# Patient Record
Sex: FEMALE | Race: WHITE | NOT HISPANIC OR LATINO
[De-identification: names, ages, dates, MRNs, and addresses within clinical notes are randomized per-mention and may not be internally consistent; named-entity substitution may affect disease eponyms.]

---

## 2021-12-13 ENCOUNTER — TRANSCRIPTION ENCOUNTER (OUTPATIENT)
Age: 36
End: 2021-12-13

## 2021-12-13 ENCOUNTER — ASOB RESULT (OUTPATIENT)
Age: 36
End: 2021-12-13

## 2021-12-13 ENCOUNTER — APPOINTMENT (OUTPATIENT)
Dept: ANTEPARTUM | Facility: CLINIC | Age: 36
End: 2021-12-13
Payer: COMMERCIAL

## 2021-12-13 PROBLEM — Z00.00 ENCOUNTER FOR PREVENTIVE HEALTH EXAMINATION: Status: ACTIVE | Noted: 2021-12-13

## 2021-12-13 PROCEDURE — 76813 OB US NUCHAL MEAS 1 GEST: CPT

## 2021-12-13 PROCEDURE — 76801 OB US < 14 WKS SINGLE FETUS: CPT

## 2022-01-05 ENCOUNTER — APPOINTMENT (OUTPATIENT)
Dept: MATERNAL FETAL MEDICINE | Facility: CLINIC | Age: 37
End: 2022-01-05
Payer: COMMERCIAL

## 2022-01-05 PROCEDURE — 99205 OFFICE O/P NEW HI 60 MIN: CPT | Mod: 95

## 2022-01-05 NOTE — DISCUSSION/SUMMARY
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 The disorder is characterized by the presence of pigmented lesions in the form of café-au-lait macules, axillary and inguinal freckling, cutaneous neurofibromas, lisch nodules of the iris, and osseus lesions. Tumors of the spinal cord or cranial nerves can be observed and have the potential for malignant transformation. Mutations in the gene for NFI, located on chromosome 17, are implicated in the majority of those affected with NF1. A large degree of variable expressivity, even within a family, can be observed in those affected.\par \par The incidence of NF1 in pregnancy is similar to that of the general population, ranging from approximately 1:3000 to 5000. A large population based [place] study noted increased rates of hypertensive disorders in pregnancy, fetal growth restriction, cerebrovascular disease,  labor and  delivery in patients with NF1. The did NOT find an increased risk of thromboembolism, acute cardiac events or maternal mortality. In terms of the effect of pregnancy on NF1, approximately 50 to 60% of patients experience growth of new or existing neurofibromas. There does appear to be a decrease in neurofibroma size in approximately 1/3 of patients postpartum, suggesting a hormonal influence on neurofibroma development. Antepartum consultation with anesthesiology is recommended due to frequent kyphoscoliosis and potential challenges for regional anesthesia.\par \par We reviewed the inheritance of NF1 and variable expressivity. Option of prenatal fetal diagnostic testing reviewed and declined. She has previously discussed this information with a genetic counselor as well.  \par \par 2. Heterozygote for factor XI deficiency:\par Personal and family bleeding history is reassuring, though Salma has not had many bleeding challenges. A positive bleeding history (which is not present) is the strongest predictor of obstetric hemorrhage. She reports seeing hematology recent and a factor XI activity of 53 percent. Based on her history and that this was noted on carrier screening would anticipate low risk of bleeding issues. However, reasonable to repeat factor activity in the 3rd trimester and continue f/u with hematology. If activity level remains > 40 percent would not anticipate issues with neuraxial anesthesia, though anesthesia consult in the 3rd trimester is recommended.\par \par 3. Maternal family h/o cardiovascular disease\par Also noted to be a carrier of familial hypercholesterolemia. This history in addition to the NF1, nulliparity and maternal age increase the risk of hypertensive d/o in the pregnancy and daily low-dose aspirin until the 34th to 36th week was recommended. Reviewed low-dose aspirin safe in setting of factor XI deficiency and can d/c earlier if any bleeding issues arise in the pregnancy. Based on this history discussed that establishing care with a cardiologist in pregnancy or after delivery is reasonable. \par \par 4. Positive carrier screening\par The positive testing in the patient and her  were reviewed. No significant reproductive risks noted. Offered follow-up with a genetic counselor.\par \par Recommendations:\par 1. Daily low-dose aspirin, 162 mg, through 34 to 36 weeks\par 2. Anesthesia consult in 3rd trimester\par 3. Will review need for MRI in pregnancy with colleagues including anesthesia\par 4. Continue follow-up with hematology. Repeat factor XI activity level in 3rd trimester.\par 5. Telehealth f/u consult in 2 weeks to discuss need for MRI\par

## 2022-01-05 NOTE — HISTORY OF PRESENT ILLNESS
[Home] : at home, [unfilled] , at the time of the visit. [Medical Office: ([facility])___] : at the medical office located in  [Verbal consent obtained from patient] : the patient, [unfilled] [FreeTextEntry1] : Maternal-Fetal Medicine consultation \par Prenatal care: 1060 obgyn\par \par Chief compliant: Neurofibromatosis in pregnancy\par \par YVON WILLOUGHBY is a 36 year  at 15w2d (SUMI 22 by ART, not IVF) that presents for a [facility] consultation. \par \par Medical history:\par 1. Neurofibromatosis type 1: Found after cafe ole spots noted at birth, has Lisch nodules, thought to be mild case. Reports a h/o scoliosis. Denies h/o brain or spinal cord involvement, but denies head or CNS imaging in the past. No other family history. Denies h/o visual disturbances or seizure.\par 2. Anorexia nervosa as a teenager, 14yo to , does not currently follow with a therapist but as in the past. "under good control" and reports being a "healthy weight" for several years now\par \par Surgical history is unremarkable, denies h/o oral surgery. [age]y/o, 5d, irregular but partially attributes anorexia. Periods continued to be irregular when anorexia well controlled except when taking OCPs. Denies h/o menorrhagia. Denies a history of anemia, but reports h/o low ferritin and B12. Gynecologic history is unremarkable. Denies bleeding issues, easy bruising or epistaxis.\par \par Current medications include prenatal vitamin, ampicillin for UTI. 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 She lives with  and dog. She reports a h/o tobacco use but quit ~7yrs ago. She denies alcohol or drug use in this pregnancy.  She has never received blood products but is willing to receive them if needed. \par \par Family history: See scanned patient provided fhx of her and her . AJ ancestry on both sides. She denies a family history of birth defects, mental retardation, developmental delay or genetic disorders. \par \par Height: 5'3" Prepregnancy weight: ~130#\par Prepregnancy BMI: 23

## 2022-01-11 ENCOUNTER — APPOINTMENT (OUTPATIENT)
Dept: ANTEPARTUM | Facility: CLINIC | Age: 37
End: 2022-01-11
Payer: COMMERCIAL

## 2022-01-11 ENCOUNTER — ASOB RESULT (OUTPATIENT)
Age: 37
End: 2022-01-11

## 2022-01-11 PROCEDURE — 76817 TRANSVAGINAL US OBSTETRIC: CPT

## 2022-01-11 PROCEDURE — 76811 OB US DETAILED SNGL FETUS: CPT

## 2022-01-18 ENCOUNTER — APPOINTMENT (OUTPATIENT)
Dept: MATERNAL FETAL MEDICINE | Facility: CLINIC | Age: 37
End: 2022-01-18
Payer: COMMERCIAL

## 2022-01-18 PROCEDURE — 99213 OFFICE O/P EST LOW 20 MIN: CPT | Mod: 95

## 2022-01-18 NOTE — DISCUSSION/SUMMARY
[FreeTextEntry1] : 1. Neurofibromatosis:\par Need for CNS imaging discussed with anesthesia, Dr. Kimball, 1/18/22. No need for imaging if no neurologic or visual symptoms. \par \par 2. Heterozygote for factor XI deficiency\par Continue follow-up with hematology. Anesthesia consult in 3rd trimester.\par \par Recommendations:\par 1. Continue daily low-dose aspirin, 162 mg, through 34 to 36 weeks\par 2. Anesthesia consult in 3rd trimester due to neurofibromatosis and factor XI deficiency\par 3. No need for CNS MRI in pregnancy if no neurologic symptoms or visual changes\par 4. Continue follow-up with hematology. Repeat factor XI activity level in 3rd trimester.\par \par \par No MFM f/u consult recommended, but patient was given my contact information and asked to reach out if questions or concerns arise. \par

## 2022-01-18 NOTE — HISTORY OF PRESENT ILLNESS
[FreeTextEntry1] : Maternal-Fetal Medicine consultation \par Prenatal care: 1060 obgyn\par \par Chief compliant: Neurofibromatosis in pregnancy\par \par YVON WILLOUGHBY is a 36 year  at 17w1d (SUMI 22 by ART, not IVF) that presents for a f/u MFM consultation. \par \par AP issues:\par 1. Neurofibromatosis type 1: Found after cafe ole spots noted at birth, has Lisch nodules, thought to be mild case. Reports a h/o scoliosis. Denies h/o brain or spinal cord involvement, but denies head or CNS imaging in the past. No other family history. Denies h/o visual disturbances or seizure.\par - Discussed case with Dr. Kimball, anesthesia, 22; No need for CNS imaging if asymptomatic, will see for consult in 3rd trimester\par \par 2. Anorexia nervosa as a teenager, 12yo to , does not currently follow with a therapist but as in the past. "under good control" and reports being a "healthy weight" for several years now\par \par 3. Factor XI heterozygote\par Bleeding history reassuring though limited. Hematology following. Anesthesia consult in 3rd trimester recommended.\par \par Height: 5'3" Prepregnancy weight: ~130#\par Prepregnancy BMI: 23 [Home] : at home, [unfilled] , at the time of the visit. [Medical Office: ([facility])___] : at the medical office located in  [Verbal consent obtained from patient] : the patient, [unfilled]

## 2022-02-15 ENCOUNTER — ASOB RESULT (OUTPATIENT)
Age: 37
End: 2022-02-15

## 2022-02-15 ENCOUNTER — APPOINTMENT (OUTPATIENT)
Dept: ANTEPARTUM | Facility: CLINIC | Age: 37
End: 2022-02-15
Payer: COMMERCIAL

## 2022-02-15 PROCEDURE — 76816 OB US FOLLOW-UP PER FETUS: CPT

## 2022-04-05 ENCOUNTER — APPOINTMENT (OUTPATIENT)
Dept: ANTEPARTUM | Facility: CLINIC | Age: 37
End: 2022-04-05
Payer: COMMERCIAL

## 2022-04-05 ENCOUNTER — ASOB RESULT (OUTPATIENT)
Age: 37
End: 2022-04-05

## 2022-04-05 PROCEDURE — 76816 OB US FOLLOW-UP PER FETUS: CPT

## 2022-04-05 PROCEDURE — 76819 FETAL BIOPHYS PROFIL W/O NST: CPT

## 2022-05-03 ENCOUNTER — ASOB RESULT (OUTPATIENT)
Age: 37
End: 2022-05-03

## 2022-05-03 ENCOUNTER — APPOINTMENT (OUTPATIENT)
Dept: ANTEPARTUM | Facility: CLINIC | Age: 37
End: 2022-05-03
Payer: COMMERCIAL

## 2022-05-03 PROCEDURE — 76818 FETAL BIOPHYS PROFILE W/NST: CPT

## 2022-05-03 PROCEDURE — 76816 OB US FOLLOW-UP PER FETUS: CPT

## 2022-05-09 ENCOUNTER — APPOINTMENT (OUTPATIENT)
Dept: PEDIATRICS | Facility: CLINIC | Age: 37
End: 2022-05-09
Payer: COMMERCIAL

## 2022-05-09 PROCEDURE — 99203 OFFICE O/P NEW LOW 30 MIN: CPT

## 2022-05-17 ENCOUNTER — ASOB RESULT (OUTPATIENT)
Age: 37
End: 2022-05-17

## 2022-05-17 ENCOUNTER — APPOINTMENT (OUTPATIENT)
Dept: ANTEPARTUM | Facility: CLINIC | Age: 37
End: 2022-05-17
Payer: COMMERCIAL

## 2022-05-17 PROCEDURE — 76816 OB US FOLLOW-UP PER FETUS: CPT

## 2022-05-17 PROCEDURE — 76819 FETAL BIOPHYS PROFIL W/O NST: CPT

## 2022-05-24 ENCOUNTER — APPOINTMENT (OUTPATIENT)
Dept: ANTEPARTUM | Facility: CLINIC | Age: 37
End: 2022-05-24
Payer: COMMERCIAL

## 2022-05-24 ENCOUNTER — ASOB RESULT (OUTPATIENT)
Age: 37
End: 2022-05-24

## 2022-05-24 PROCEDURE — 76816 OB US FOLLOW-UP PER FETUS: CPT

## 2022-05-24 PROCEDURE — 76818 FETAL BIOPHYS PROFILE W/NST: CPT

## 2022-05-31 ENCOUNTER — ASOB RESULT (OUTPATIENT)
Age: 37
End: 2022-05-31

## 2022-05-31 ENCOUNTER — APPOINTMENT (OUTPATIENT)
Dept: ANTEPARTUM | Facility: CLINIC | Age: 37
End: 2022-05-31
Payer: COMMERCIAL

## 2022-05-31 PROCEDURE — 76816 OB US FOLLOW-UP PER FETUS: CPT

## 2022-05-31 PROCEDURE — 76819 FETAL BIOPHYS PROFIL W/O NST: CPT

## 2022-06-07 ENCOUNTER — APPOINTMENT (OUTPATIENT)
Dept: ANTEPARTUM | Facility: CLINIC | Age: 37
End: 2022-06-07
Payer: COMMERCIAL

## 2022-06-07 ENCOUNTER — ASOB RESULT (OUTPATIENT)
Age: 37
End: 2022-06-07

## 2022-06-07 PROCEDURE — 76818 FETAL BIOPHYS PROFILE W/NST: CPT

## 2022-06-07 PROCEDURE — 76816 OB US FOLLOW-UP PER FETUS: CPT

## 2022-06-09 ENCOUNTER — OUTPATIENT (OUTPATIENT)
Dept: OUTPATIENT SERVICES | Facility: HOSPITAL | Age: 37
LOS: 1 days | End: 2022-06-09
Payer: COMMERCIAL

## 2022-06-09 DIAGNOSIS — O26.899 OTHER SPECIFIED PREGNANCY RELATED CONDITIONS, UNSPECIFIED TRIMESTER: ICD-10-CM

## 2022-06-09 DIAGNOSIS — Z3A.00 WEEKS OF GESTATION OF PREGNANCY NOT SPECIFIED: ICD-10-CM

## 2022-06-09 PROCEDURE — 99214 OFFICE O/P EST MOD 30 MIN: CPT

## 2022-06-14 ENCOUNTER — ASOB RESULT (OUTPATIENT)
Age: 37
End: 2022-06-14

## 2022-06-14 ENCOUNTER — APPOINTMENT (OUTPATIENT)
Dept: ANTEPARTUM | Facility: CLINIC | Age: 37
End: 2022-06-14
Payer: COMMERCIAL

## 2022-06-14 PROCEDURE — 76819 FETAL BIOPHYS PROFIL W/O NST: CPT

## 2022-06-14 PROCEDURE — 76816 OB US FOLLOW-UP PER FETUS: CPT

## 2022-06-16 ENCOUNTER — ASOB RESULT (OUTPATIENT)
Age: 37
End: 2022-06-16

## 2022-06-16 ENCOUNTER — APPOINTMENT (OUTPATIENT)
Dept: ANTEPARTUM | Facility: CLINIC | Age: 37
End: 2022-06-16
Payer: COMMERCIAL

## 2022-06-16 PROCEDURE — 76818 FETAL BIOPHYS PROFILE W/NST: CPT

## 2022-06-16 PROCEDURE — 76816 OB US FOLLOW-UP PER FETUS: CPT

## 2022-06-21 ENCOUNTER — ASOB RESULT (OUTPATIENT)
Age: 37
End: 2022-06-21

## 2022-06-21 ENCOUNTER — APPOINTMENT (OUTPATIENT)
Dept: ANTEPARTUM | Facility: CLINIC | Age: 37
End: 2022-06-21
Payer: COMMERCIAL

## 2022-06-21 ENCOUNTER — INPATIENT (INPATIENT)
Facility: HOSPITAL | Age: 37
LOS: 2 days | Discharge: HOME | End: 2022-06-24
Attending: OBSTETRICS & GYNECOLOGY | Admitting: OBSTETRICS & GYNECOLOGY
Payer: COMMERCIAL

## 2022-06-21 VITALS — HEIGHT: 63 IN | WEIGHT: 164.02 LBS

## 2022-06-21 DIAGNOSIS — O26.899 OTHER SPECIFIED PREGNANCY RELATED CONDITIONS, UNSPECIFIED TRIMESTER: ICD-10-CM

## 2022-06-21 DIAGNOSIS — Z3A.00 WEEKS OF GESTATION OF PREGNANCY NOT SPECIFIED: ICD-10-CM

## 2022-06-21 LAB
ALBUMIN SERPL ELPH-MCNC: 4.1 G/DL — SIGNIFICANT CHANGE UP (ref 3.3–5)
ALP SERPL-CCNC: 168 U/L — HIGH (ref 40–120)
ALT FLD-CCNC: 13 U/L — SIGNIFICANT CHANGE UP (ref 10–45)
ANION GAP SERPL CALC-SCNC: 13 MMOL/L — SIGNIFICANT CHANGE UP (ref 5–17)
APPEARANCE UR: CLEAR — SIGNIFICANT CHANGE UP
APTT BLD: 29.1 SEC — SIGNIFICANT CHANGE UP (ref 27.5–35.5)
AST SERPL-CCNC: 19 U/L — SIGNIFICANT CHANGE UP (ref 10–40)
BASOPHILS # BLD AUTO: 0.05 K/UL — SIGNIFICANT CHANGE UP (ref 0–0.2)
BASOPHILS NFR BLD AUTO: 0.5 % — SIGNIFICANT CHANGE UP (ref 0–2)
BILIRUB SERPL-MCNC: 0.7 MG/DL — SIGNIFICANT CHANGE UP (ref 0.2–1.2)
BILIRUB UR-MCNC: NEGATIVE — SIGNIFICANT CHANGE UP
BLD GP AB SCN SERPL QL: NEGATIVE — SIGNIFICANT CHANGE UP
BUN SERPL-MCNC: 9 MG/DL — SIGNIFICANT CHANGE UP (ref 7–23)
CALCIUM SERPL-MCNC: 9.6 MG/DL — SIGNIFICANT CHANGE UP (ref 8.4–10.5)
CHLORIDE SERPL-SCNC: 101 MMOL/L — SIGNIFICANT CHANGE UP (ref 96–108)
CO2 SERPL-SCNC: 21 MMOL/L — LOW (ref 22–31)
COLOR SPEC: YELLOW — SIGNIFICANT CHANGE UP
COVID-19 SPIKE DOMAIN AB INTERP: POSITIVE
COVID-19 SPIKE DOMAIN ANTIBODY RESULT: >250 U/ML — HIGH
CREAT ?TM UR-MCNC: 50 MG/DL — SIGNIFICANT CHANGE UP
CREAT SERPL-MCNC: 0.54 MG/DL — SIGNIFICANT CHANGE UP (ref 0.5–1.3)
DIFF PNL FLD: NEGATIVE — SIGNIFICANT CHANGE UP
EGFR: 122 ML/MIN/1.73M2 — SIGNIFICANT CHANGE UP
EOSINOPHIL # BLD AUTO: 0.05 K/UL — SIGNIFICANT CHANGE UP (ref 0–0.5)
EOSINOPHIL NFR BLD AUTO: 0.5 % — SIGNIFICANT CHANGE UP (ref 0–6)
GLUCOSE SERPL-MCNC: 79 MG/DL — SIGNIFICANT CHANGE UP (ref 70–99)
GLUCOSE UR QL: NEGATIVE — SIGNIFICANT CHANGE UP
HCT VFR BLD CALC: 40.5 % — SIGNIFICANT CHANGE UP (ref 34.5–45)
HGB BLD-MCNC: 13.4 G/DL — SIGNIFICANT CHANGE UP (ref 11.5–15.5)
IMM GRANULOCYTES NFR BLD AUTO: 0.3 % — SIGNIFICANT CHANGE UP (ref 0–1.5)
INR BLD: 0.96 — SIGNIFICANT CHANGE UP (ref 0.88–1.16)
KETONES UR-MCNC: NEGATIVE — SIGNIFICANT CHANGE UP
LDH SERPL L TO P-CCNC: 168 U/L — SIGNIFICANT CHANGE UP (ref 50–242)
LEUKOCYTE ESTERASE UR-ACNC: ABNORMAL
LYMPHOCYTES # BLD AUTO: 0.9 K/UL — LOW (ref 1–3.3)
LYMPHOCYTES # BLD AUTO: 9.7 % — LOW (ref 13–44)
MCHC RBC-ENTMCNC: 30.2 PG — SIGNIFICANT CHANGE UP (ref 27–34)
MCHC RBC-ENTMCNC: 33.1 GM/DL — SIGNIFICANT CHANGE UP (ref 32–36)
MCV RBC AUTO: 91.2 FL — SIGNIFICANT CHANGE UP (ref 80–100)
MONOCYTES # BLD AUTO: 0.83 K/UL — SIGNIFICANT CHANGE UP (ref 0–0.9)
MONOCYTES NFR BLD AUTO: 8.9 % — SIGNIFICANT CHANGE UP (ref 2–14)
NEUTROPHILS # BLD AUTO: 7.42 K/UL — HIGH (ref 1.8–7.4)
NEUTROPHILS NFR BLD AUTO: 80.1 % — HIGH (ref 43–77)
NITRITE UR-MCNC: NEGATIVE — SIGNIFICANT CHANGE UP
NRBC # BLD: 0 /100 WBCS — SIGNIFICANT CHANGE UP (ref 0–0)
PH UR: 6 — SIGNIFICANT CHANGE UP (ref 5–8)
PLATELET # BLD AUTO: 242 K/UL — SIGNIFICANT CHANGE UP (ref 150–400)
POTASSIUM SERPL-MCNC: 4.7 MMOL/L — SIGNIFICANT CHANGE UP (ref 3.5–5.3)
POTASSIUM SERPL-SCNC: 4.7 MMOL/L — SIGNIFICANT CHANGE UP (ref 3.5–5.3)
PROT ?TM UR-MCNC: 5 MG/DL — SIGNIFICANT CHANGE UP (ref 0–12)
PROT SERPL-MCNC: 6.7 G/DL — SIGNIFICANT CHANGE UP (ref 6–8.3)
PROT UR-MCNC: NEGATIVE MG/DL — SIGNIFICANT CHANGE UP
PROT/CREAT UR-RTO: 0.1 RATIO — SIGNIFICANT CHANGE UP (ref 0–0.2)
PROTHROM AB SERPL-ACNC: 11.4 SEC — SIGNIFICANT CHANGE UP (ref 10.5–13.4)
RBC # BLD: 4.44 M/UL — SIGNIFICANT CHANGE UP (ref 3.8–5.2)
RBC # FLD: 13.7 % — SIGNIFICANT CHANGE UP (ref 10.3–14.5)
RH IG SCN BLD-IMP: POSITIVE — SIGNIFICANT CHANGE UP
RH IG SCN BLD-IMP: POSITIVE — SIGNIFICANT CHANGE UP
SARS-COV-2 IGG+IGM SERPL QL IA: >250 U/ML — HIGH
SARS-COV-2 IGG+IGM SERPL QL IA: POSITIVE
SARS-COV-2 RNA SPEC QL NAA+PROBE: SIGNIFICANT CHANGE UP
SODIUM SERPL-SCNC: 135 MMOL/L — SIGNIFICANT CHANGE UP (ref 135–145)
SP GR SPEC: <=1.005 — SIGNIFICANT CHANGE UP (ref 1–1.03)
URATE SERPL-MCNC: 6.6 MG/DL — SIGNIFICANT CHANGE UP (ref 2.5–7)
UROBILINOGEN FLD QL: 0.2 E.U./DL — SIGNIFICANT CHANGE UP
WBC # BLD: 9.28 K/UL — SIGNIFICANT CHANGE UP (ref 3.8–10.5)
WBC # FLD AUTO: 9.28 K/UL — SIGNIFICANT CHANGE UP (ref 3.8–10.5)

## 2022-06-21 PROCEDURE — 76816 OB US FOLLOW-UP PER FETUS: CPT

## 2022-06-21 PROCEDURE — 76819 FETAL BIOPHYS PROFIL W/O NST: CPT

## 2022-06-21 RX ORDER — CITRIC ACID/SODIUM CITRATE 300-500 MG
15 SOLUTION, ORAL ORAL ONCE
Refills: 0 | Status: DISCONTINUED | OUTPATIENT
Start: 2022-06-21 | End: 2022-06-22

## 2022-06-21 RX ORDER — OXYTOCIN 10 UNIT/ML
1 VIAL (ML) INJECTION
Qty: 30 | Refills: 0 | Status: DISCONTINUED | OUTPATIENT
Start: 2022-06-21 | End: 2022-06-24

## 2022-06-21 RX ORDER — OXYTOCIN 10 UNIT/ML
333.33 VIAL (ML) INJECTION
Qty: 20 | Refills: 0 | Status: DISCONTINUED | OUTPATIENT
Start: 2022-06-21 | End: 2022-06-22

## 2022-06-21 RX ORDER — SODIUM CHLORIDE 9 MG/ML
1000 INJECTION, SOLUTION INTRAVENOUS
Refills: 0 | Status: DISCONTINUED | OUTPATIENT
Start: 2022-06-21 | End: 2022-06-22

## 2022-06-21 RX ADMIN — Medication 1 MILLIUNIT(S)/MIN: at 13:48

## 2022-06-21 NOTE — PATIENT PROFILE OB - FALL HARM RISK - UNIVERSAL INTERVENTIONS
Bed in lowest position, wheels locked, appropriate side rails in place/Call bell, personal items and telephone in reach/Instruct patient to call for assistance before getting out of bed or chair/Non-slip footwear when patient is out of bed/[place] to call system/Physically safe environment - no spills, clutter or unnecessary equipment/Purposeful Proactive Rounding/Room/bathroom lighting operational, light cord in reach

## 2022-06-21 NOTE — PATIENT PROFILE OB - SUPPORT PERSON PHONE
[phone]
Chest Pain    WHAT YOU NEED TO KNOW:    Chest pain can be caused by a range of conditions, from not serious to life-threatening. Chest pain can be a symptom of a digestive problem, such as acid reflux or a stomach ulcer. An anxiety attack or a strong emotion, such as anger, can also cause chest pain. Infection, inflammation, or a fracture in the bones or cartilage in your chest can cause pain or discomfort. Sometimes chest pain or pressure is caused by poor blood flow to your heart (angina). Chest pain may also be caused by life-threatening conditions such as a heart attack or blood clot in your lungs.     DISCHARGE INSTRUCTIONS:    Call 911 if:     You have any of the following signs of a heart attack:   Squeezing, pressure, or pain in your chest       and any of the following:   Discomfort or pain in your back, neck, jaw, stomach, or arm       Shortness of breath      Nausea or vomiting      Lightheadedness or a sudden cold sweat        Return to the emergency department if:     You have chest discomfort that gets worse, even with medicine.      You cough or vomit blood.       Your bowel movements are black or bloody.       You cannot stop vomiting, or it hurts to swallow.     Contact your healthcare provider if:     You have questions or concerns about your condition or care.        Medicines:     Medicines may be given to treat the cause of your chest pain. Examples include pain medicine, anxiety medicine, or medicines to increase blood flow to your heart.       Do not take certain medicines without asking your healthcare provider first. These include NSAIDs, herbal or vitamin supplements, or hormones (estrogen or progestin).       Take your medicine as directed. Contact your healthcare provider if you think your medicine is not helping or if you have side effects. Tell him or her if you are allergic to any medicine. Keep a list of the medicines, vitamins, and herbs you take. Include the amounts, and when and why you take them. Bring the list or the pill bottles to follow-up visits. Carry your medicine list with you in case of an emergency.    Follow up with your healthcare provider within 72 hours, or as directed: You may need to return for more tests to find the cause of your chest pain. You may be referred to a specialist, such as a cardiologist or gastroenterologist. Write down your questions so you remember to ask them during your visits.     Healthy living tips: The following are general healthy guidelines. If your chest pain is caused by a heart problem, your healthcare provider will give you specific guidelines to follow.    Do not smoke. Nicotine and other chemicals in cigarettes and cigars can cause lung and heart damage. Ask your healthcare provider for information if you currently smoke and need help to quit. E-cigarettes or smokeless tobacco still contain nicotine. Talk to your healthcare provider before you use these products.       Eat a variety of healthy, low-fat, low-salt foods. Healthy foods include fruits, vegetables, whole-grain breads, low-fat dairy products, beans, lean meats, and fish. Ask for more information about a heart healthy diet.      Drink plenty of water every day. Your body is made of mostly water. Water helps your body to control your temperature and blood pressure. Ask your healthcare provider how much water you should drink every day.      Ask about activity. Your healthcare provider will tell you which activities to limit or avoid. Ask when you can drive, return to work, and have sex. Ask about the best exercise plan for you.      Maintain a healthy weight. Ask your healthcare provider how much you should weigh. Ask him or her to help you create a weight loss plan if you are overweight.       Get the flu and pneumonia vaccines. All adults should get the influenza (flu) vaccine. Get it every year as soon as it becomes available. The pneumococcal vaccine is given to adults aged 65 years or older. The vaccine is given every 5 years to prevent pneumococcal disease, such as pneumonia.    If you have a stent:     Carry your stent card with you at all times.       Let all healthcare providers know that you have a stent.

## 2022-06-22 ENCOUNTER — TRANSCRIPTION ENCOUNTER (OUTPATIENT)
Age: 37
End: 2022-06-22

## 2022-06-22 ENCOUNTER — RESULT REVIEW (OUTPATIENT)
Age: 37
End: 2022-06-22

## 2022-06-22 LAB — T PALLIDUM AB TITR SER: NEGATIVE — SIGNIFICANT CHANGE UP

## 2022-06-22 PROCEDURE — 88307 TISSUE EXAM BY PATHOLOGIST: CPT | Mod: 26

## 2022-06-22 RX ORDER — IBUPROFEN 200 MG
600 TABLET ORAL EVERY 6 HOURS
Refills: 0 | Status: DISCONTINUED | OUTPATIENT
Start: 2022-06-22 | End: 2022-06-24

## 2022-06-22 RX ORDER — DIBUCAINE 1 %
1 OINTMENT (GRAM) RECTAL EVERY 6 HOURS
Refills: 0 | Status: DISCONTINUED | OUTPATIENT
Start: 2022-06-22 | End: 2022-06-24

## 2022-06-22 RX ORDER — OXYCODONE HYDROCHLORIDE 5 MG/1
5 TABLET ORAL ONCE
Refills: 0 | Status: DISCONTINUED | OUTPATIENT
Start: 2022-06-22 | End: 2022-06-24

## 2022-06-22 RX ORDER — HYDROCORTISONE 1 %
1 OINTMENT (GRAM) TOPICAL EVERY 6 HOURS
Refills: 0 | Status: DISCONTINUED | OUTPATIENT
Start: 2022-06-22 | End: 2022-06-24

## 2022-06-22 RX ORDER — AER TRAVELER 0.5 G/1
1 SOLUTION RECTAL; TOPICAL EVERY 4 HOURS
Refills: 0 | Status: DISCONTINUED | OUTPATIENT
Start: 2022-06-22 | End: 2022-06-24

## 2022-06-22 RX ORDER — KETOROLAC TROMETHAMINE 30 MG/ML
30 SYRINGE (ML) INJECTION ONCE
Refills: 0 | Status: DISCONTINUED | OUTPATIENT
Start: 2022-06-22 | End: 2022-06-22

## 2022-06-22 RX ORDER — SIMETHICONE 80 MG/1
80 TABLET, CHEWABLE ORAL EVERY 4 HOURS
Refills: 0 | Status: DISCONTINUED | OUTPATIENT
Start: 2022-06-22 | End: 2022-06-24

## 2022-06-22 RX ORDER — FENTANYL/BUPIVACAINE/NS/PF 2MCG/ML-.1
250 PLASTIC BAG, INJECTION (ML) INJECTION
Refills: 0 | Status: DISCONTINUED | OUTPATIENT
Start: 2022-06-22 | End: 2022-06-22

## 2022-06-22 RX ORDER — OXYCODONE HYDROCHLORIDE 5 MG/1
5 TABLET ORAL
Refills: 0 | Status: DISCONTINUED | OUTPATIENT
Start: 2022-06-22 | End: 2022-06-24

## 2022-06-22 RX ORDER — TETANUS TOXOID, REDUCED DIPHTHERIA TOXOID AND ACELLULAR PERTUSSIS VACCINE, ADSORBED 5; 2.5; 8; 8; 2.5 [IU]/.5ML; [IU]/.5ML; UG/.5ML; UG/.5ML; UG/.5ML
0.5 SUSPENSION INTRAMUSCULAR ONCE
Refills: 0 | Status: DISCONTINUED | OUTPATIENT
Start: 2022-06-22 | End: 2022-06-24

## 2022-06-22 RX ORDER — ACETAMINOPHEN 500 MG
3 TABLET ORAL
Qty: 0 | Refills: 0 | DISCHARGE
Start: 2022-06-22

## 2022-06-22 RX ORDER — LANOLIN
1 OINTMENT (GRAM) TOPICAL EVERY 6 HOURS
Refills: 0 | Status: DISCONTINUED | OUTPATIENT
Start: 2022-06-22 | End: 2022-06-24

## 2022-06-22 RX ORDER — OXYTOCIN 10 UNIT/ML
333.33 VIAL (ML) INJECTION
Qty: 20 | Refills: 0 | Status: DISCONTINUED | OUTPATIENT
Start: 2022-06-22 | End: 2022-06-24

## 2022-06-22 RX ORDER — DIPHENHYDRAMINE HCL 50 MG
25 CAPSULE ORAL EVERY 6 HOURS
Refills: 0 | Status: DISCONTINUED | OUTPATIENT
Start: 2022-06-22 | End: 2022-06-24

## 2022-06-22 RX ORDER — PRAMOXINE HYDROCHLORIDE 150 MG/15G
1 AEROSOL, FOAM RECTAL EVERY 4 HOURS
Refills: 0 | Status: DISCONTINUED | OUTPATIENT
Start: 2022-06-22 | End: 2022-06-24

## 2022-06-22 RX ORDER — SODIUM CHLORIDE 9 MG/ML
3 INJECTION INTRAMUSCULAR; INTRAVENOUS; SUBCUTANEOUS EVERY 8 HOURS
Refills: 0 | Status: DISCONTINUED | OUTPATIENT
Start: 2022-06-22 | End: 2022-06-24

## 2022-06-22 RX ORDER — ACETAMINOPHEN 500 MG
975 TABLET ORAL
Refills: 0 | Status: DISCONTINUED | OUTPATIENT
Start: 2022-06-22 | End: 2022-06-24

## 2022-06-22 RX ORDER — BENZOCAINE 10 %
1 GEL (GRAM) MUCOUS MEMBRANE EVERY 6 HOURS
Refills: 0 | Status: DISCONTINUED | OUTPATIENT
Start: 2022-06-22 | End: 2022-06-24

## 2022-06-22 RX ORDER — IBUPROFEN 200 MG
600 TABLET ORAL EVERY 6 HOURS
Refills: 0 | Status: COMPLETED | OUTPATIENT
Start: 2022-06-22 | End: 2023-05-21

## 2022-06-22 RX ORDER — MAGNESIUM HYDROXIDE 400 MG/1
30 TABLET, CHEWABLE ORAL
Refills: 0 | Status: DISCONTINUED | OUTPATIENT
Start: 2022-06-22 | End: 2022-06-24

## 2022-06-22 RX ORDER — IBUPROFEN 200 MG
1 TABLET ORAL
Qty: 0 | Refills: 0 | DISCHARGE
Start: 2022-06-22

## 2022-06-22 RX ADMIN — Medication 975 MILLIGRAM(S): at 13:52

## 2022-06-22 RX ADMIN — Medication 975 MILLIGRAM(S): at 14:30

## 2022-06-22 RX ADMIN — SODIUM CHLORIDE 125 MILLILITER(S): 9 INJECTION, SOLUTION INTRAVENOUS at 03:21

## 2022-06-22 RX ADMIN — Medication 1 TABLET(S): at 13:52

## 2022-06-22 RX ADMIN — Medication 975 MILLIGRAM(S): at 20:19

## 2022-06-22 RX ADMIN — SODIUM CHLORIDE 3 MILLILITER(S): 9 INJECTION INTRAMUSCULAR; INTRAVENOUS; SUBCUTANEOUS at 22:45

## 2022-06-22 RX ADMIN — Medication 975 MILLIGRAM(S): at 19:42

## 2022-06-22 RX ADMIN — SODIUM CHLORIDE 125 MILLILITER(S): 9 INJECTION, SOLUTION INTRAVENOUS at 04:24

## 2022-06-22 RX ADMIN — Medication 30 MILLIGRAM(S): at 10:34

## 2022-06-22 NOTE — LACTATION INITIAL EVALUATION - MILK SUPPLY
Pt called c/o of vomiting and cramping muscles all over her body. Pt says she was in ICU a week ago and had to be given Potassium due to low levels and that she also has a ulcer. I urged her to go to the nearest ER for evaluation. She stated that she could not go today because her  was on duty and issues with . I then again explained to her that she should be evaluated ASAP in the ER due to her recent medical HX. She then told me she would try to make it into the ER today. colostrum

## 2022-06-22 NOTE — LACTATION INITIAL EVALUATION - LACTATION INTERVENTIONS
initiate/review safe skin-to-skin/initiate/review hand expression/initiate/review pumping guidelines and safe milk handling/reverse pressure softening/initiate/review techniques for position and latch/post discharge community resources provided/review techniques to increase milk supply/review techniques to manage sore nipples/engorgement/initiate/review finger suck/initiate/review breast massage/compression/reviewed components of an effective feeding and at least 8 effective feedings per day required/reviewed importance of monitoring infant diapers, the breastfeeding log, and minimum output each day/reviewed risks of artificial nipples/reviewed benefits and recommendations for rooming in/reviewed feeding on demand/by cue at least 8 times a day/reviewed indications of inadequate milk transfer that would require supplementation

## 2022-06-22 NOTE — DISCHARGE NOTE OB - CARE PROVIDER_API CALL
Leila Lozada)  Obstetrics and Gynecology  [address] [place] [address]  Phone: [phone]  Fax: [phone]  Follow Up Time:

## 2022-06-22 NOTE — DISCHARGE NOTE OB - HOSPITAL COURSE
s/p . Patient has factor 11 deficiency, however no issues, no PPH, patient didn't receive any blood products. Uncomplicated hospital course. Patient stable at time of discharge. Patient ambulating and tolerating PO. Met all postpartum milestones.

## 2022-06-22 NOTE — DISCHARGE NOTE OB - NS MD DC FALL RISK RISK
For information on Fall & Injury Prevention, visit: [email or web address] OR  [email or web address] OR  https://www.cdc.gov/steadi/patient.html

## 2022-06-22 NOTE — DISCHARGE NOTE OB - PATIENT PORTAL LINK FT
You can access the FollowMyHealth Patient Portal offered by [facility] by registering at the following website: [email or web address]. By joining [employer]’s FollowMyHealth portal, you will also be able to view your health information using other applications (apps) compatible with our system.

## 2022-06-23 RX ADMIN — Medication 975 MILLIGRAM(S): at 09:43

## 2022-06-23 RX ADMIN — SODIUM CHLORIDE 3 MILLILITER(S): 9 INJECTION INTRAMUSCULAR; INTRAVENOUS; SUBCUTANEOUS at 22:04

## 2022-06-23 RX ADMIN — Medication 975 MILLIGRAM(S): at 22:50

## 2022-06-23 RX ADMIN — Medication 1 APPLICATION(S): at 07:19

## 2022-06-23 RX ADMIN — Medication 975 MILLIGRAM(S): at 15:30

## 2022-06-23 RX ADMIN — Medication 1 APPLICATION(S): at 07:20

## 2022-06-23 RX ADMIN — Medication 1 TABLET(S): at 12:15

## 2022-06-23 RX ADMIN — Medication 975 MILLIGRAM(S): at 20:57

## 2022-06-23 RX ADMIN — Medication 975 MILLIGRAM(S): at 15:07

## 2022-06-23 RX ADMIN — Medication 975 MILLIGRAM(S): at 01:02

## 2022-06-23 RX ADMIN — Medication 975 MILLIGRAM(S): at 01:03

## 2022-06-23 RX ADMIN — Medication 975 MILLIGRAM(S): at 09:04

## 2022-06-24 VITALS
HEART RATE: 62 BPM | SYSTOLIC BLOOD PRESSURE: 116 MMHG | OXYGEN SATURATION: 95 % | RESPIRATION RATE: 18 BRPM | DIASTOLIC BLOOD PRESSURE: 73 MMHG | TEMPERATURE: 98 F

## 2022-06-24 PROCEDURE — 85610 PROTHROMBIN TIME: CPT

## 2022-06-24 PROCEDURE — 85384 FIBRINOGEN ACTIVITY: CPT

## 2022-06-24 PROCEDURE — 86900 BLOOD TYPING SEROLOGIC ABO: CPT

## 2022-06-24 PROCEDURE — 59050 FETAL MONITOR W/REPORT: CPT

## 2022-06-24 PROCEDURE — 86850 RBC ANTIBODY SCREEN: CPT

## 2022-06-24 PROCEDURE — 36415 COLL VENOUS BLD VENIPUNCTURE: CPT

## 2022-06-24 PROCEDURE — 84550 ASSAY OF BLOOD/URIC ACID: CPT

## 2022-06-24 PROCEDURE — 88307 TISSUE EXAM BY PATHOLOGIST: CPT

## 2022-06-24 PROCEDURE — 86901 BLOOD TYPING SEROLOGIC RH(D): CPT

## 2022-06-24 PROCEDURE — 85025 COMPLETE CBC W/AUTO DIFF WBC: CPT

## 2022-06-24 PROCEDURE — 80053 COMPREHEN METABOLIC PANEL: CPT

## 2022-06-24 PROCEDURE — 81001 URINALYSIS AUTO W/SCOPE: CPT

## 2022-06-24 PROCEDURE — 82570 ASSAY OF URINE CREATININE: CPT

## 2022-06-24 PROCEDURE — 84156 ASSAY OF PROTEIN URINE: CPT

## 2022-06-24 PROCEDURE — 86780 TREPONEMA PALLIDUM: CPT

## 2022-06-24 PROCEDURE — 86769 SARS-COV-2 COVID-19 ANTIBODY: CPT

## 2022-06-24 PROCEDURE — 87635 SARS-COV-2 COVID-19 AMP PRB: CPT

## 2022-06-24 PROCEDURE — 85730 THROMBOPLASTIN TIME PARTIAL: CPT

## 2022-06-24 PROCEDURE — 99214 OFFICE O/P EST MOD 30 MIN: CPT

## 2022-06-24 PROCEDURE — 83615 LACTATE (LD) (LDH) ENZYME: CPT

## 2022-06-24 RX ADMIN — Medication 1 TABLET(S): at 13:59

## 2022-06-24 RX ADMIN — SODIUM CHLORIDE 3 MILLILITER(S): 9 INJECTION INTRAMUSCULAR; INTRAVENOUS; SUBCUTANEOUS at 05:06

## 2022-06-24 RX ADMIN — Medication 975 MILLIGRAM(S): at 16:15

## 2022-06-24 RX ADMIN — Medication 975 MILLIGRAM(S): at 03:27

## 2022-06-24 RX ADMIN — Medication 975 MILLIGRAM(S): at 11:00

## 2022-06-24 RX ADMIN — Medication 975 MILLIGRAM(S): at 10:10

## 2022-06-24 RX ADMIN — Medication 975 MILLIGRAM(S): at 15:21

## 2022-06-24 RX ADMIN — Medication 975 MILLIGRAM(S): at 04:20

## 2022-06-24 NOTE — PROGRESS NOTE ADULT - ASSESSMENT
A/P 36y PPD#0 s/p , stable  1. Pain: well controlled on OPM  2. GI: Regular diet  3. DVT prophylaxis: ambulate  4. Dispo: PPD1 or 2, unless otherwise specified
A/P 36y s/p , PPD #1  , stable, meeting postpartum milestones   - Pain: well controlled on opm  - GI: Tolerating regular diet  - : urinating without difficulty/pain  -DVT prophylaxis: ambulating frequently  -Dispo: PPD 2, unless otherwise specified  
A/P 36y s/p , PPD#2 , stable, meeting postpartum milestones   - Pain: well controlled on tylenol/motrin  - GI: Tolerating regular diet  - : urinating without difficulty/pain  - DVT prophylaxis: ambulating frequently  - Dispo: PPD 2, unless otherwise specified

## 2022-06-24 NOTE — PROGRESS NOTE ADULT - SUBJECTIVE AND OBJECTIVE BOX
Patient evaluated at bedside.   She reports pain is well controlled with po pain meds  She denies headache, dizziness, chest pain, palpitations, shortness of breathe, nausea, vomiting, heavy vaginal bleeding or perineal discomfort.    Physical Exam:  Vital Signs Last 24 Hrs  T(C): 37.3 (22 Jun 2022 11:00), Max: 37.3 (22 Jun 2022 11:00)  T(F): 99.1 (22 Jun 2022 11:00), Max: 99.1 (22 Jun 2022 11:00)  HR: 87 (22 Jun 2022 11:00) (84 - 104)  BP: 120/70 (22 Jun 2022 11:00) (120/70 - 146/83)  BP(mean): --  RR: 18 (22 Jun 2022 11:00) (18 - 18)  SpO2: 97% (22 Jun 2022 11:00) (97% - 100%)    GA: NAD, A+0 x 3  Abd: + BS, soft, nontender, nondistended, no rebound or guarding, uterus firm at midline, 1  fb below umbilicus  : lochia WNL  Extremities: no swelling or calf tenderness, reflexes +2 bilaterally                          13.4   9.28  )-----------( 242      ( 21 Jun 2022 12:27 )             40.5     06-21    135  |  101  |  9   ----------------------------<  79  4.7   |  21<L>  |  0.54    Ca    9.6      21 Jun 2022 12:27    TPro  6.7  /  Alb  4.1  /  TBili  0.7  /  DBili  x   /  AST  19  /  ALT  13  /  AlkPhos  168<H>  06-21    PT/INR - ( 21 Jun 2022 12:27 )   PT: 11.4 sec;   INR: 0.96          PTT - ( 21 Jun 2022 12:27 )  PTT:29.1 sec    
Patient seen and evaluated at bedside this morning, no acute events overnight.    She is lying comfortably in bed, reports pain is well controlled with tylenol and motrin. She has been ambulating without assistance, voiding spontaneously, and tolerating food.  Denies headache, dizziness, chest pain, palpitations, shortness of breath, nausea/vomiting, or heavy vaginal bleeding. Reports decrease in amount of vaginal bleeding and denies clots.    Physical Exam:  T(C): 36.9 (06-24-22 @ 06:16), Max: 36.9 (06-24-22 @ 06:16)  HR: 62 (06-24-22 @ 06:16) (62 - 62)  BP: 116/73 (06-24-22 @ 06:16) (116/73 - 116/73)  RR: 18 (06-24-22 @ 06:16) (18 - 18)  SpO2: 95% (06-24-22 @ 06:16) (95% - 95%)    GA: NAD, A&O x 3  CV: regular rate  Pulm: no inc WOB  Abd: soft, NT/ND, no rebound or guarding, uterus firm at midline and 2  fb below umbilicus  Perineum: normal lochia, intact, healing well  Extremities: mild pedal edema b/l, no calf tenderness            acetaminophen     Tablet .. 975 milliGRAM(s) Oral <User Schedule>  benzocaine 20%/menthol 0.5% Spray 1 Spray(s) Topical every 6 hours PRN  dibucaine 1% Ointment 1 Application(s) Topical every 6 hours PRN  diphenhydrAMINE 25 milliGRAM(s) Oral every 6 hours PRN  diphtheria/tetanus/pertussis (acellular) Vaccine (ADAcel) 0.5 milliLiter(s) IntraMuscular once  hydrocortisone 1% Cream 1 Application(s) Topical every 6 hours PRN  ibuprofen  Tablet. 600 milliGRAM(s) Oral every 6 hours  lanolin Ointment 1 Application(s) Topical every 6 hours PRN  magnesium hydroxide Suspension 30 milliLiter(s) Oral two times a day PRN  oxyCODONE    IR 5 milliGRAM(s) Oral every 3 hours PRN  oxyCODONE    IR 5 milliGRAM(s) Oral once PRN  oxytocin Infusion 333.333 milliUNIT(s)/Min IV Continuous <Continuous>  oxytocin Infusion. 1 milliUNIT(s)/Min IV Continuous <Continuous>  pramoxine 1%/zinc 5% Cream 1 Application(s) Topical every 4 hours PRN  prenatal multivitamin 1 Tablet(s) Oral daily  simethicone 80 milliGRAM(s) Chew every 4 hours PRN  sodium chloride 0.9% lock flush 3 milliLiter(s) IV Push every 8 hours  witch hazel Pads 1 Application(s) Topical every 4 hours PRN  
Patient evaluated at bedside this morning, resting comfortable in bed, no acute events overnight.  She reports pain is well controlled with tylenol and motrin  She denies headache, dizziness, chest pain, palpitations, shortness of breath, nausea, vomiting, heavy vaginal bleeding or perineal discomfort. Reports decrease in amount of vaginal bleeding and denies clots.  She has been ambulating without assistance, voiding spontaneously, and is breastfeeding.   Tolerating food well, without nausea/vomit.  Passing flatus.     Physical Exam:  T(C): 36.9 (06-23-22 @ 06:06), Max: 37.2 (06-22-22 @ 20:40)  HR: 70 (06-23-22 @ 06:06) (70 - 84)  BP: 114/72 (06-23-22 @ 06:06) (113/69 - 125/74)  RR: 18 (06-23-22 @ 06:06) (18 - 18)  SpO2: 96% (06-23-22 @ 06:06) (96% - 100%)    GA: NAD, A&O x 3  Pulm: non distressed breathing  Abd: + BS, soft, nontender, nondistended, no rebound or guarding, uterus firm at midline and 2  fb below umbilicus  Perineum: normal lochia, intact, healing well, no hematoma  Extremities: no calf tenderness  Skin: no rashes                          13.4   9.28  )-----------( 242      ( 21 Jun 2022 12:27 )             40.5     06-21    135  |  101  |  9   ----------------------------<  79  4.7   |  21<L>  |  0.54    Ca    9.6      21 Jun 2022 12:27    TPro  6.7  /  Alb  4.1  /  TBili  0.7  /  DBili  x   /  AST  19  /  ALT  13  /  AlkPhos  168<H>  06-21    acetaminophen     Tablet .. 975 milliGRAM(s) Oral <User Schedule>  benzocaine 20%/menthol 0.5% Spray 1 Spray(s) Topical every 6 hours PRN  dibucaine 1% Ointment 1 Application(s) Topical every 6 hours PRN  diphenhydrAMINE 25 milliGRAM(s) Oral every 6 hours PRN  diphtheria/tetanus/pertussis (acellular) Vaccine (ADAcel) 0.5 milliLiter(s) IntraMuscular once  fentanyl (2 MICROgram(s)/mL) + bupivacaine 0.0625%  in 0.9% Sodium Chloride PCEA 250 milliLiter(s) Epidural PCA Continuous  hydrocortisone 1% Cream 1 Application(s) Topical every 6 hours PRN  ibuprofen  Tablet. 600 milliGRAM(s) Oral every 6 hours  lanolin Ointment 1 Application(s) Topical every 6 hours PRN  magnesium hydroxide Suspension 30 milliLiter(s) Oral two times a day PRN  oxyCODONE    IR 5 milliGRAM(s) Oral every 3 hours PRN  oxyCODONE    IR 5 milliGRAM(s) Oral once PRN  oxytocin Infusion 333.333 milliUNIT(s)/Min IV Continuous <Continuous>  oxytocin Infusion. 1 milliUNIT(s)/Min IV Continuous <Continuous>  pramoxine 1%/zinc 5% Cream 1 Application(s) Topical every 4 hours PRN  prenatal multivitamin 1 Tablet(s) Oral daily  simethicone 80 milliGRAM(s) Chew every 4 hours PRN  sodium chloride 0.9% lock flush 3 milliLiter(s) IV Push every 8 hours  witch hazel Pads 1 Application(s) Topical every 4 hours PRN

## 2022-06-25 ENCOUNTER — EMERGENCY (EMERGENCY)
Facility: HOSPITAL | Age: 37
LOS: 1 days | Discharge: HOME | End: 2022-06-25
Attending: EMERGENCY MEDICINE | Admitting: EMERGENCY MEDICINE
Payer: COMMERCIAL

## 2022-06-25 VITALS
HEART RATE: 70 BPM | HEIGHT: 63 IN | SYSTOLIC BLOOD PRESSURE: 146 MMHG | RESPIRATION RATE: 18 BRPM | TEMPERATURE: 98 F | DIASTOLIC BLOOD PRESSURE: 89 MMHG | OXYGEN SATURATION: 98 %

## 2022-06-25 DIAGNOSIS — R07.89 OTHER CHEST PAIN: ICD-10-CM

## 2022-06-25 DIAGNOSIS — R22.43 LOCALIZED SWELLING, MASS AND LUMP, LOWER LIMB, BILATERAL: ICD-10-CM

## 2022-06-25 DIAGNOSIS — D68.1 HEREDITARY FACTOR XI DEFICIENCY: ICD-10-CM

## 2022-06-25 DIAGNOSIS — I49.3 VENTRICULAR PREMATURE DEPOLARIZATION: ICD-10-CM

## 2022-06-25 DIAGNOSIS — R06.02 SHORTNESS OF BREATH: ICD-10-CM

## 2022-06-25 DIAGNOSIS — Z20.822 CONTACT WITH AND (SUSPECTED) EXPOSURE TO COVID-19: ICD-10-CM

## 2022-06-25 PROCEDURE — 99291 CRITICAL CARE FIRST HOUR: CPT | Mod: 25

## 2022-06-25 PROCEDURE — 71045 X-RAY EXAM CHEST 1 VIEW: CPT | Mod: 26

## 2022-06-25 PROCEDURE — 93010 ELECTROCARDIOGRAM REPORT: CPT

## 2022-06-25 RX ORDER — LABETALOL HCL 100 MG
20 TABLET ORAL ONCE
Refills: 0 | Status: DISCONTINUED | OUTPATIENT
Start: 2022-06-25 | End: 2022-06-26

## 2022-06-25 NOTE — ED PROVIDER NOTE - PROGRESS NOTE DETAILS
Bennie: GYN consulted, requesting holding antihypertensives for now, will reassess. Klepfish: Hgb 11.1, alk phos 121, , other labs grossly wnl. CXR wnl. UA w/ moderate blood, small leuk esterase. Neg protein. BP fluctuating from 130s to low 150s. Remains well appearing. Awaiting GYN eval/dispo. Repaged. Updated pt. Klepfish: Pt feeling much better, no SOB/CP. Evaluated by GYN. Recommending PO labetalol, outpt f/u.   Discussed importance of outpt follow up and return precautions. Clinically no indication for further emergent ED workup or hospitalization at this time. Comfortable for dc, outpt f/u.

## 2022-06-25 NOTE — ED PROVIDER NOTE - WR INTERPRETATION 2
CXR negative - No pneumothorax, No opacities, No free air  (only 1 XR performed - issue w/ crossing over)

## 2022-06-25 NOTE — ED ADULT TRIAGE NOTE - ARRIVAL INFO ADDITIONAL COMMENTS
pt is 3 days s/p induced vaginal delivery who developed leg swelling and sob today.    BP at home 134/89.

## 2022-06-25 NOTE — ED PROVIDER NOTE - PATIENT PORTAL LINK FT
You can access the FollowMyHealth Patient Portal offered by [facility] by registering at the following website: [email or web address]. By joining [employer]’s FollowMyHealth portal, you will also be able to view your health information using other applications (apps) compatible with our system.

## 2022-06-25 NOTE — ED PROVIDER NOTE - CLINICAL SUMMARY MEDICAL DECISION MAKING FREE TEXT BOX
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 Since yesterday pt has mild intermittent chest pressure/SOB. Today also noticed b/l LE swelling. Tonight measured BP and was 135 which is high for her. Called her OB who referred to ED. Has unchanged mild vaginal bleeding. No other systemic symptoms.   Hypertensive, other vitals wnl. Exam as above.  ddx: Concern for post partum preeclampsia vs. less likely post partum pulm edema. Very unlikely PE.   Labs, EKG/CXR, BP control, possible Mg, GYN consult, reassess.

## 2022-06-25 NOTE — ED PROVIDER NOTE - NSFOLLOWUPINSTRUCTIONS_ED_ALL_ED_FT
Begin taking labetalol twice a day - measure your blood pressure prior to taking the medication - Do not take the medication if your blood pressure is <120/80.    Call your OB to schedule follow up appointment for this Monday or Tuesday.     Return to ER for worsening headache, worsening abdominal pain, worsening swelling, worsening vision changes, worsening chest pain, worsening lightheadedness, worsening breathing.     Follow up with cardiologist for persistent symptoms of shortness of breath/chest pain.     Follow up with cardiologist. Can call [phone] (HEART [facility]AT) to schedule appointment.    Can also call the following offices:    Dr. Keren García, Dr. Domo Zhu  [phone]  [address], [address]  [place], [place]    Dr. Eren Adrian  [phone]  [address] [facility], [address]  [place], [place]    Dr. Sinan Pickard  [phone]    [address], [address] [place], [place]  [phone]  Dr. Chin Barkley, Dr. Jocelyne Dowell, Dr. Karine Montero, Dr. Chin Torres, Dr. Williams Galan, Dr. Dustin Dixon, Dr. Milady Freeman, Dr. Eren Adrian    [address], [address]  [place], [place]  [phone]  Dr. Rico Martinez, Dr. Josephine Chester, Dr. Maria Isabel Gonzales, Dr. Sinan Pickard, Dr. Elda Pavon, Dr. Jey Zamorano, Dr. Chika Rodriguez    [address], [address] [place], [place]  [phone]  Dr. Sotero Zamorano, Dr. Luis E Ballard, Dr. Doc Fuentes, Dr. Dustin Ceja, Dr. Domo Zhu, Dr. Mey Arriaza    [address], 9 [facility]  [phone]  Dr. Warner Choi, Dr. Nikole Live, Dr. Nahid Fitzgerald, Dr. Maria Isabel Gonzales, Dr. Reuben Miller, Dr. Jey Zamorano, Dr. Ernesto Dunne, Dr. Major Shane, Dr. Gerardo Strong    [phone]  Dr. Remi Panda    [phone]  Dr. Gail Coker, Dr. Dustin Dixon    [address]  [place], [place]  [phone]  Dr. Keren García    [phone]  Dr. Óscar Stephens, Dr. Homero Lu, Dr. Za Camarillo, Dr. Santino Helton, Dr. Dana Monzon, Dr. Rosalie Enriquez, Dr. Elda Ventura, Dr. Stephane Robin, Dr. Milady Freeman    [phone]  Dr. Miguelito Núñez, Dr. Major Shane, Dr. Gerardo Strong    [address] [facility] [address], [place]  [phone]  Dr. Luis Daniel Herr, Dr. Quinn Cast, Dr. Raz Bone, Dr. Nahid Cat    [address] [place], [place]  [phone]  Dr. Luis Daniel Herr    [address] [place], [place]  [phone]  Dr. Dustin Dixon    [address]  [place], [place]  [phone]  Dr. Raz Bone    [address] [place], [place]  [phone]  Dr. Chika Rodriguez    [address] [place] [address]  [phone]  Dr. Dustin Dixon    [address] [place], [place]  [phone]  Dr. Chin Barkley    [address]  [phone]  Dr. Luis Daniel Herr, Dr. Quinn Cast, Dr. Raz Bone, Dr. Nahid Cat    [address] [facility], 2nd floor  [place], [place]  [phone]  Dr. Domo Zhu    [address] [place] [address], 3rd [place], [phone]  Dr. Nahid Fitzgerald, Dr. Maria Isabel Gonzales, Dr. Alexander Fernandez    [address] [place], [place]  [phone]  Dr. Luis Daniel Herr, Dr. Quinn Cast, Dr. Raz Bone, Dr. Nahid Cat           Preeclampsia and Eclampsia      Preeclampsia is a serious condition that may develop during pregnancy. This condition involves high blood pressure during pregnancy and causes symptoms such as headaches, vision changes, and increased swelling in the legs, hands, and face. Preeclampsia occurs after 20 weeks of pregnancy. Eclampsia is a seizure that happens from worsening preeclampsia. Diagnosing and managing preeclampsia early is important. If not treated early, it can cause serious problems for mother and baby.    There is no cure for this condition. However, during pregnancy, delivering the baby may be the best treatment for preeclampsia or eclampsia. For most women, symptoms of preeclampsia and eclampsia go away after giving birth. In rare cases, a woman may develop preeclampsia or eclampsia after giving birth. This usually occurs within 48 hours after childbirth but may occur up to 6 weeks after giving birth.      What are the causes?    The cause of this condition is not known.      What increases the risk?    The following factors make you more likely to develop preeclampsia:  •Being pregnant for the first time or being pregnant with multiples.      •Having had preeclampsia or a condition called hemolysis, elevated liver enzymes, and low platelet count (HELLP)syndrome during a past pregnancy.      •Having a family history of preeclampsia.      •Being older than age 35.      •Being obese.      •Becoming pregnant through fertility treatments.      Conditions that reduce blood flow or oxygen to your placenta and baby may also increase your risk. These include:  •High blood pressure before, during, or immediately following pregnancy.      •Kidney disease.      •Diabetes.      •Blood clotting disorders.      •Autoimmune diseases, such as lupus.      •Sleep apnea.        What are the signs or symptoms?    Common symptoms of this condition include:  •A severe, throbbing headache that does not go away.      •Vision problems, such as blurred or double vision and light sensitivity.      •Pain in the stomach, especially the right upper region.      •Pain in the shoulder.      Other symptoms that may develop as the condition gets worse include:  •Sudden weight gain because of fluid buildup in the body. This causes swelling of the face, hands, legs, and feet.      •Severe nausea and vomiting.      •Urinating less than usual.      •Shortness of breath.      •Seizures.        How is this diagnosed?     Your health care provider will ask you about symptoms and check for signs of preeclampsia during your prenatal visits. You will also have routine tests, including:  •Checking your blood pressure.      •Urine tests to check for protein.      •Blood tests to assess your organ function.      •Monitoring your baby's heart rate.      •Ultrasounds to check fetal growth.        How is this treated?    You and your health care provider will determine the treatment that is best for you. Treatment may include:  •Frequent prenatal visits to check for preeclampsia.      •Medicine to lower your blood pressure.      •Medicine to prevent seizures.      •Low-dose aspirin during your pregnancy.      •Staying in the hospital, in severe cases. You will be given medicines to control your blood pressure and the amount of fluids in your body.      •Delivering your baby.      Work with your health care provider to manage any chronic health conditions, such as diabetes or kidney problems. Also, work with your health care provider to manage weight gain during pregnancy.      Follow these instructions at home:    Eating and drinking     •Drink enough fluid to keep your urine pale yellow.      •Avoid caffeine. Caffeine may increase blood pressure and heart rate and lead to dehydration.      •Reduce the amount of salt that you eat.      Lifestyle     • Do not use any products that contain nicotine or tobacco. These products include cigarettes, chewing tobacco, and vaping devices, such as e-cigarettes. If you need help quitting, ask your health care provider.      • Do not use alcohol or drugs.      •Avoid stress as much as possible.      •Rest and get plenty of sleep.        General instructions      •Take over-the-counter and prescription medicines only as told by your health care provider.      •When lying down, lie on your left side. This keeps pressure off your major blood vessels.      •When sitting or lying down, raise (elevate) your feet. Try putting pillows underneath your lower legs.      •Exercise regularly. Ask your health care provider what kinds of exercise are best for you.      •Check your blood pressure as often as recommended by your health care provider.      •Keep all prenatal and follow-up visits. This is important.        Contact a health care provider if:  •You have symptoms that may need treatment or closer monitoring. These include:  •Headaches.      •Stomach pain or nausea and vomiting.      •Shoulder pain.      •Vision problems, such as spots in front of your eyes or blurry vision.      •Sudden weight gain or increased swelling in your face, hands, legs, and feet.      •Increased anxiety or feeling of impending doom.      •Signs or symptoms of labor.          Get help right away if:  •You have any of the following symptoms:  •A seizure.      •Shortness of breath or trouble breathing.      •Trouble speaking or slurred speech.      •Fainting.      •Chest pain.        These symptoms may represent a serious problem that is an emergency. Do not wait to see if the symptoms will go away. Get medical help right away. Call your local emergency services (911 in the [place] Do not drive yourself to the hospital.       Summary    •Preeclampsia is a serious condition that may develop during pregnancy.      •Diagnosing and treating preeclampsia early is very important.      •Keep all prenatal and follow-up visits. This is important.      •Get help right away if you have a seizure, shortness of breath or trouble breathing, trouble speaking or slurred speech, chest pain, or fainting.      This information is not intended to replace advice given to you by your health care provider. Make sure you discuss any questions you have with your health care provider.

## 2022-06-26 VITALS
HEART RATE: 68 BPM | RESPIRATION RATE: 18 BRPM | TEMPERATURE: 98 F | SYSTOLIC BLOOD PRESSURE: 136 MMHG | DIASTOLIC BLOOD PRESSURE: 70 MMHG | OXYGEN SATURATION: 98 %

## 2022-06-26 LAB
ALBUMIN SERPL ELPH-MCNC: 3.8 G/DL — SIGNIFICANT CHANGE UP (ref 3.3–5)
ALP SERPL-CCNC: 121 U/L — HIGH (ref 40–120)
ALT FLD-CCNC: 16 U/L — SIGNIFICANT CHANGE UP (ref 10–45)
ANION GAP SERPL CALC-SCNC: 9 MMOL/L — SIGNIFICANT CHANGE UP (ref 5–17)
APPEARANCE UR: CLEAR — SIGNIFICANT CHANGE UP
APTT BLD: 30 SEC — SIGNIFICANT CHANGE UP (ref 27.5–35.5)
AST SERPL-CCNC: 21 U/L — SIGNIFICANT CHANGE UP (ref 10–40)
BACTERIA # UR AUTO: PRESENT /HPF
BASOPHILS # BLD AUTO: 0.06 K/UL — SIGNIFICANT CHANGE UP (ref 0–0.2)
BASOPHILS NFR BLD AUTO: 0.8 % — SIGNIFICANT CHANGE UP (ref 0–2)
BILIRUB SERPL-MCNC: 0.3 MG/DL — SIGNIFICANT CHANGE UP (ref 0.2–1.2)
BILIRUB UR-MCNC: NEGATIVE — SIGNIFICANT CHANGE UP
BUN SERPL-MCNC: 14 MG/DL — SIGNIFICANT CHANGE UP (ref 7–23)
CALCIUM SERPL-MCNC: 9.2 MG/DL — SIGNIFICANT CHANGE UP (ref 8.4–10.5)
CHLORIDE SERPL-SCNC: 105 MMOL/L — SIGNIFICANT CHANGE UP (ref 96–108)
CO2 SERPL-SCNC: 24 MMOL/L — SIGNIFICANT CHANGE UP (ref 22–31)
COLOR SPEC: YELLOW — SIGNIFICANT CHANGE UP
CREAT ?TM UR-MCNC: 8 MG/DL — SIGNIFICANT CHANGE UP
CREAT SERPL-MCNC: 0.58 MG/DL — SIGNIFICANT CHANGE UP (ref 0.5–1.3)
DIFF PNL FLD: ABNORMAL
EGFR: 120 ML/MIN/1.73M2 — SIGNIFICANT CHANGE UP
EOSINOPHIL # BLD AUTO: 0.37 K/UL — SIGNIFICANT CHANGE UP (ref 0–0.5)
EOSINOPHIL NFR BLD AUTO: 4.7 % — SIGNIFICANT CHANGE UP (ref 0–6)
EPI CELLS # UR: SIGNIFICANT CHANGE UP /HPF (ref 0–5)
GLUCOSE SERPL-MCNC: 85 MG/DL — SIGNIFICANT CHANGE UP (ref 70–99)
GLUCOSE UR QL: NEGATIVE — SIGNIFICANT CHANGE UP
HCT VFR BLD CALC: 36.9 % — SIGNIFICANT CHANGE UP (ref 34.5–45)
HGB BLD-MCNC: 11.1 G/DL — LOW (ref 11.5–15.5)
IMM GRANULOCYTES NFR BLD AUTO: 0.5 % — SIGNIFICANT CHANGE UP (ref 0–1.5)
INR BLD: 0.97 — SIGNIFICANT CHANGE UP (ref 0.88–1.16)
KETONES UR-MCNC: NEGATIVE — SIGNIFICANT CHANGE UP
LEUKOCYTE ESTERASE UR-ACNC: ABNORMAL
LYMPHOCYTES # BLD AUTO: 1.37 K/UL — SIGNIFICANT CHANGE UP (ref 1–3.3)
LYMPHOCYTES # BLD AUTO: 17.3 % — SIGNIFICANT CHANGE UP (ref 13–44)
MAGNESIUM SERPL-MCNC: 2 MG/DL — SIGNIFICANT CHANGE UP (ref 1.6–2.6)
MCHC RBC-ENTMCNC: 30.1 GM/DL — LOW (ref 32–36)
MCHC RBC-ENTMCNC: 30.7 PG — SIGNIFICANT CHANGE UP (ref 27–34)
MCV RBC AUTO: 102.2 FL — HIGH (ref 80–100)
MONOCYTES # BLD AUTO: 0.95 K/UL — HIGH (ref 0–0.9)
MONOCYTES NFR BLD AUTO: 12 % — SIGNIFICANT CHANGE UP (ref 2–14)
NEUTROPHILS # BLD AUTO: 5.14 K/UL — SIGNIFICANT CHANGE UP (ref 1.8–7.4)
NEUTROPHILS NFR BLD AUTO: 64.7 % — SIGNIFICANT CHANGE UP (ref 43–77)
NITRITE UR-MCNC: NEGATIVE — SIGNIFICANT CHANGE UP
NRBC # BLD: 0 /100 WBCS — SIGNIFICANT CHANGE UP (ref 0–0)
NT-PROBNP SERPL-SCNC: 506 PG/ML — HIGH (ref 0–300)
PH UR: 7 — SIGNIFICANT CHANGE UP (ref 5–8)
PLATELET # BLD AUTO: 273 K/UL — SIGNIFICANT CHANGE UP (ref 150–400)
POTASSIUM SERPL-MCNC: 4.6 MMOL/L — SIGNIFICANT CHANGE UP (ref 3.5–5.3)
POTASSIUM SERPL-SCNC: 4.6 MMOL/L — SIGNIFICANT CHANGE UP (ref 3.5–5.3)
PROT ?TM UR-MCNC: <4 MG/DL — SIGNIFICANT CHANGE UP (ref 0–12)
PROT SERPL-MCNC: 6.2 G/DL — SIGNIFICANT CHANGE UP (ref 6–8.3)
PROT UR-MCNC: NEGATIVE MG/DL — SIGNIFICANT CHANGE UP
PROT/CREAT UR-RTO: SIGNIFICANT CHANGE UP (ref 0–0.2)
PROTHROM AB SERPL-ACNC: 11.5 SEC — SIGNIFICANT CHANGE UP (ref 10.5–13.4)
RBC # BLD: 3.61 M/UL — LOW (ref 3.8–5.2)
RBC # FLD: 13.2 % — SIGNIFICANT CHANGE UP (ref 10.3–14.5)
RBC CASTS # UR COMP ASSIST: < 5 /HPF — SIGNIFICANT CHANGE UP
SARS-COV-2 RNA SPEC QL NAA+PROBE: NEGATIVE — SIGNIFICANT CHANGE UP
SODIUM SERPL-SCNC: 138 MMOL/L — SIGNIFICANT CHANGE UP (ref 135–145)
SP GR SPEC: 1.01 — SIGNIFICANT CHANGE UP (ref 1–1.03)
TROPONIN T SERPL-MCNC: <0.01 NG/ML — SIGNIFICANT CHANGE UP (ref 0–0.01)
URATE SERPL-MCNC: 6.5 MG/DL — SIGNIFICANT CHANGE UP (ref 2.5–7)
UROBILINOGEN FLD QL: 0.2 E.U./DL — SIGNIFICANT CHANGE UP
WBC # BLD: 7.93 K/UL — SIGNIFICANT CHANGE UP (ref 3.8–10.5)
WBC # FLD AUTO: 7.93 K/UL — SIGNIFICANT CHANGE UP (ref 3.8–10.5)
WBC UR QL: < 5 /HPF — SIGNIFICANT CHANGE UP

## 2022-06-26 PROCEDURE — 71045 X-RAY EXAM CHEST 1 VIEW: CPT | Mod: 26,76

## 2022-06-26 PROCEDURE — 71045 X-RAY EXAM CHEST 1 VIEW: CPT

## 2022-06-26 PROCEDURE — 93005 ELECTROCARDIOGRAM TRACING: CPT

## 2022-06-26 PROCEDURE — 36415 COLL VENOUS BLD VENIPUNCTURE: CPT

## 2022-06-26 PROCEDURE — 82570 ASSAY OF URINE CREATININE: CPT

## 2022-06-26 PROCEDURE — 80053 COMPREHEN METABOLIC PANEL: CPT

## 2022-06-26 PROCEDURE — 85025 COMPLETE CBC W/AUTO DIFF WBC: CPT

## 2022-06-26 PROCEDURE — 87635 SARS-COV-2 COVID-19 AMP PRB: CPT

## 2022-06-26 PROCEDURE — 84484 ASSAY OF TROPONIN QUANT: CPT

## 2022-06-26 PROCEDURE — 85730 THROMBOPLASTIN TIME PARTIAL: CPT

## 2022-06-26 PROCEDURE — 83735 ASSAY OF MAGNESIUM: CPT

## 2022-06-26 PROCEDURE — 84550 ASSAY OF BLOOD/URIC ACID: CPT

## 2022-06-26 PROCEDURE — 84156 ASSAY OF PROTEIN URINE: CPT

## 2022-06-26 PROCEDURE — 83880 ASSAY OF NATRIURETIC PEPTIDE: CPT

## 2022-06-26 PROCEDURE — 99291 CRITICAL CARE FIRST HOUR: CPT | Mod: 25

## 2022-06-26 PROCEDURE — 81001 URINALYSIS AUTO W/SCOPE: CPT

## 2022-06-26 PROCEDURE — 85610 PROTHROMBIN TIME: CPT

## 2022-06-26 PROCEDURE — 87086 URINE CULTURE/COLONY COUNT: CPT

## 2022-06-26 RX ORDER — LABETALOL HCL 100 MG
1 TABLET ORAL
Qty: 14 | Refills: 0
Start: 2022-06-26 | End: 2022-07-02

## 2022-06-26 RX ORDER — LABETALOL HCL 100 MG
200 TABLET ORAL ONCE
Refills: 0 | Status: COMPLETED | OUTPATIENT
Start: 2022-06-26 | End: 2022-06-26

## 2022-06-26 RX ADMIN — Medication 200 MILLIGRAM(S): at 02:31

## 2022-06-26 NOTE — ED ADULT NURSE NOTE - OBJECTIVE STATEMENT
36 y F, arrived to ED after pt gave birth 3 days ago at [facility], pt states her bp has been elevated at home to 160s, pt brought to ED. pt denies dizziness, headache. denies chest pain, sob, nausea, vomiting, fever

## 2022-06-26 NOTE — CONSULT NOTE ADULT - ASSESSMENT
35yo  PP4 s/p  after IOL for oligohydramnios (JARRETT 4.57).  Comes in bc she took her BP at home and noted 139/80, her ankles were swollen, and she had some chest tightness.   - High mild range BPs, no need to push IV medication, and no symptoms currently except for LE edema which is bilateral and within expected limits in postpartum period  - CXR clear per ED, with no SOB currently and clear lungs on physical exam, satting well ORA  - Negative trops, non-tachycardic, elevated trops but per ED attending = nonspecific finding which is not concerning given lack of other findings.  - Would recommend close outpatient monitoring with f/u in 1 week and if continued chest symptoms, would recommend outpatient f/u with Cardiology.  - Lab work within normal limits.    - Recommend Labetalol 200 BID with 2x/day BP checks. If lower than 120/80, skip medication.  - High risk for PEC given hx of gHTN, however pt is complaint and responsible and there is low concern for being lost to follow-up per attending. OK for discharge home with below strict precautions and for follow-up early next week with her outpatient OBGYN.  - If SBPs >160 or DBP >110, please call your OBGYN right away or come to the ED. Look out for changes in vision, headaches that don't go away, new shortness of breath on exertion, or upper abdominal pain. If you notice anything like this, all your doctor right away.    -   Dr. Richard PGY2 bedside  Discussed w/ Dr. Almeida (PGY3) and Dr. Osman (Attending)

## 2022-06-26 NOTE — ED ADULT NURSE REASSESSMENT NOTE - NS ED NURSE REASSESS COMMENT FT1
GYN at bedside, awaiting on recs. pt blood pressure continue to be in the high 130 to 150 range systolic.

## 2022-06-26 NOTE — CONSULT NOTE ADULT - SUBJECTIVE AND OBJECTIVE BOX
35yo  PP4 s/p  after IOL for oligohydramnios (JARRETT 4.57) c/b gHTN.  Comes in bc she took her BP at home and noted 139/80, her ankles were swollen, and she had some chest tightness. Her chest tightness as resolved and she reports she has been quite stressed out and sleep deprived since the birth of her daughter. She denies visual symptoms, RUQ pain, or headache. Meeting all postpartum milestones: eating/drinking, ambulating, voiding, +flatus, +bm. Pain has resolved. No vaginal discharge/odor. Mild amount of lochia. Pt is breastfeeding with no difficulty.     Pt denies fever, chills, SOB, nausea, vomiting    denies PMHx except for factor XI carrier  denies PSHx  PObHx:   PGynHx: denies F/OC/STI/abnormal pap  SH: no toxic habits  Meds: PNV  NKDA    PHYSICAL EXAM:   Vital Signs Last 24 Hrs  T(C): 36.7 (2022 23:32), Max: 36.7 (2022 23:32)  T(F): 98 (2022 23:32), Max: 98 (2022 23:32)  HR: 64 (2022 01:01) (60 - 70)  BP: 148/77 (2022 01:01) (139/75 - 155/82)  RR: 18 (2022 00:03) (18 - 18)  SpO2: 98% (2022 00:03) (98% - 98%)      Constitutional: NAD  Respiratory: CTAB; no wheezing, rhonchi, or crackles  Cardiovascular: RRR, no murmurs, or gallops  Gastrointestinal: soft, non-tender, non-distended, +bowel sounds, no rebound or guarding, no fundal tenderness, fundus 3fb below umbilicus  GYN: small dark brown lochia on pad  Extremities: no calf tenderness or swelling, 2+ reflexes at biceps      LABS:                        11.1   7.93  )-----------( 273      ( 2022 00:11 )             36.9     06-    138  |  105  |  14  ----------------------------<  85  4.6   |  24  |  0.58    Ca    9.2      2022 00:11  Mg     2.0         TPro  6.2  /  Alb  3.8  /  TBili  0.3  /  DBili  x   /  AST  21  /  ALT  16  /  AlkPhos  121<H>      PT/INR - ( 2022 00:11 )   PT: 11.5 sec;   INR: 0.97          PTT - ( 2022 00:11 )  PTT:30.0 sec  Urinalysis Basic - ( 2022 00:11 )    Color: Yellow / Appearance: Clear / S.010 / pH: x  Gluc: x / Ketone: NEGATIVE  / Bili: Negative / Urobili: 0.2 E.U./dL   Blood: x / Protein: NEGATIVE mg/dL / Nitrite: NEGATIVE   Leuk Esterase: Small / RBC: < 5 /HPF / WBC < 5 /HPF   Sq Epi: x / Non Sq Epi: 0-5 /HPF / Bacteria: Present /HPF          RADIOLOGY & ADDITIONAL STUDIES:

## 2022-06-27 LAB
CULTURE RESULTS: SIGNIFICANT CHANGE UP
SPECIMEN SOURCE: SIGNIFICANT CHANGE UP

## 2022-06-28 DIAGNOSIS — D68.2 HEREDITARY DEFICIENCY OF OTHER CLOTTING FACTORS: ICD-10-CM

## 2022-06-28 DIAGNOSIS — O41.03X0 OLIGOHYDRAMNIOS, THIRD TRIMESTER, NOT APPLICABLE OR UNSPECIFIED: ICD-10-CM

## 2022-06-28 DIAGNOSIS — Z3A.39 39 WEEKS GESTATION OF PREGNANCY: ICD-10-CM

## 2022-07-05 LAB — SURGICAL PATHOLOGY STUDY: SIGNIFICANT CHANGE UP

## 2022-07-14 ENCOUNTER — APPOINTMENT (OUTPATIENT)
Dept: HEART AND VASCULAR | Facility: CLINIC | Age: 37
End: 2022-07-14

## 2022-08-01 ENCOUNTER — NON-APPOINTMENT (OUTPATIENT)
Age: 37
End: 2022-08-01

## 2022-08-01 ENCOUNTER — APPOINTMENT (OUTPATIENT)
Dept: HEART AND VASCULAR | Facility: CLINIC | Age: 37
End: 2022-08-01

## 2022-08-01 VITALS
OXYGEN SATURATION: 98 % | WEIGHT: 145 LBS | HEART RATE: 75 BPM | TEMPERATURE: 97.7 F | SYSTOLIC BLOOD PRESSURE: 121 MMHG | HEIGHT: 63 IN | BODY MASS INDEX: 25.69 KG/M2 | RESPIRATION RATE: 14 BRPM | DIASTOLIC BLOOD PRESSURE: 80 MMHG

## 2022-08-01 DIAGNOSIS — J90 PLEURAL EFFUSION, NOT ELSEWHERE CLASSIFIED: ICD-10-CM

## 2022-08-01 PROCEDURE — 99204 OFFICE O/P NEW MOD 45 MIN: CPT

## 2022-08-01 RX ORDER — LABETALOL HYDROCHLORIDE 200 MG/1
200 TABLET, FILM COATED ORAL
Qty: 60 | Refills: 0 | Status: DISCONTINUED | COMMUNITY
Start: 2022-07-27 | End: 2022-08-01

## 2022-08-01 NOTE — ASSESSMENT
[FreeTextEntry1] : YVON WILLOUGHBY is a 36 year F with past medical history significant for FH. She is here for cardiac evaluation post ED visit for hypertension, leg edema and elevated pro-BNP, and possible pleural effusion , 3 days postpartum. She is overall doing well. BP is now within normal limits off meds and leg edema has resolved. ECG only with septal TWI otherwise normal. \par - check proBNP level\par - check lipids \par - Schedule an echo to evaluate for post partum cardiomyopathy\par - Return to see me after test results \par Patient advised to go to the nearest ED whenever any symptoms persist and/or worsens.  Patient/Family/Caregiver verbalized complete understanding of these instructions.\par \par \par

## 2022-08-01 NOTE — REVIEW OF SYSTEMS
[Fever] : no fever [Headache] : no headache [Chills] : no chills [Feeling Fatigued] : not feeling fatigued [SOB] : no shortness of breath [Dyspnea on exertion] : not dyspnea during exertion [Chest Discomfort] : no chest discomfort [Lower Ext Edema] : no extremity edema [Leg Claudication] : no intermittent leg claudication [Palpitations] : no palpitations [Orthopnea] : no orthopnea [PND] : no PND [Syncope] : no syncope [Cough] : no cough [Wheezing] : no wheezing [Coughing Up Blood] : no hemoptysis [Dizziness] : no dizziness [Convulsions] : no convulsions [Limb Weakness (Paresis)] : no limb weakness (Paresis) [Confusion] : no confusion was observed [Depression] : no depression [Anxiety] : no anxiety [Under Stress] : not under stress [Suicidal] : not suicidal

## 2022-08-01 NOTE — PHYSICAL EXAM
[Well Developed] : well developed [Well Nourished] : well nourished [No Acute Distress] : no acute distress [Normal Venous Pressure] : normal venous pressure [No Carotid Bruit] : no carotid bruit [Normal S1, S2] : normal S1, S2 [No Murmur] : no murmur [No Rub] : no rub [No Gallop] : no gallop [Clear Lung Fields] : clear lung fields [Good Air Entry] : good air entry [No Respiratory Distress] : no respiratory distress  [No Edema] : no edema [No Cyanosis] : no cyanosis [No Clubbing] : no clubbing [Moves all extremities] : moves all extremities [No Focal Deficits] : no focal deficits [Normal Speech] : normal speech [Alert and Oriented] : alert and oriented [Normal memory] : normal memory

## 2022-08-01 NOTE — HISTORY OF PRESENT ILLNESS
[FreeTextEntry1] : YVON WILLOUGHBY is a 36 year y.o. F with history of FH (not on statins) and she is s/p delivery of a healthy baby on 6/22/22.  \par She is here for cardiac evaluation post ED visit.  \par She went to ED on 6/25/22 for breast pain , fatigue, leg edema and noted elevated BP. \par She was found to have elevated proBNP = 506 and CXR with possible pleural effusion. The rest of the work-up was ok. \par She states baby delivery was normal. She states no complications and baby is healthy. She denies having history of preeclampsia. \par She was given labetalol 200 mg for HTN and advised to follow-up with a cardiologist. \par She only took labetalol 5 times and then discontinued it because her BP normalized. She states her leg edema resolved as well. \par She overall feels better \par Denies CP, SOB, palpitations, orthopnea, dizziness, syncope, LH\par Patient denies changes in her exercise tolerance during the past 6 months. She can walk 10 blocks and can climb 2 flights of stairs. \par Sleeps with 1 pillow\par \par She denies history of MI, CVA, DM. \par Denies family history of premature ASCVD and /or SCD.  \par \par \par LABS (6/28/22): Hgb 11.9; Hct 35.7; K 4.5; Cre 0.58; LFTs wnl; \par (6/26/22): ProBNP 506; Hgb 11.1; Hct 36.9; eGFR 120; \par \par EKG (8/1/22): NSR at 63 bpm with septal TWI \par \par

## 2022-08-08 ENCOUNTER — APPOINTMENT (OUTPATIENT)
Dept: HEART AND VASCULAR | Facility: CLINIC | Age: 37
End: 2022-08-08

## 2022-08-08 LAB
CHOLEST SERPL-MCNC: 278 MG/DL
HDLC SERPL-MCNC: 80 MG/DL
LDLC SERPL CALC-MCNC: 184 MG/DL
NONHDLC SERPL-MCNC: 199 MG/DL
NT-PROBNP SERPL-MCNC: 15 PG/ML
TRIGL SERPL-MCNC: 71 MG/DL

## 2022-08-08 PROCEDURE — 93308 TTE F-UP OR LMTD: CPT

## 2022-08-15 ENCOUNTER — APPOINTMENT (OUTPATIENT)
Dept: HEART AND VASCULAR | Facility: CLINIC | Age: 37
End: 2022-08-15

## 2022-08-18 ENCOUNTER — APPOINTMENT (OUTPATIENT)
Dept: HEART AND VASCULAR | Facility: CLINIC | Age: 37
End: 2022-08-18

## 2022-08-18 DIAGNOSIS — E78.01 FAMILIAL HYPERCHOLESTEROLEMIA: ICD-10-CM

## 2022-08-18 DIAGNOSIS — R79.89 OTHER SPECIFIED ABNORMAL FINDINGS OF BLOOD CHEMISTRY: ICD-10-CM

## 2022-08-18 PROCEDURE — 99442: CPT

## 2022-08-18 NOTE — PHYSICAL EXAM
[Well Developed] : well developed [No Acute Distress] : no acute distress [No Respiratory Distress] : no respiratory distress  [Normal Speech] : normal speech [Alert and Oriented] : alert and oriented [Normal memory] : normal memory

## 2022-08-22 PROBLEM — E78.01 FAMILIAL HYPERCHOLESTEROLEMIA: Status: ACTIVE | Noted: 2022-08-01

## 2022-08-22 PROBLEM — R79.89 ELEVATED BRAIN NATRIURETIC PEPTIDE (BNP) LEVEL: Status: ACTIVE | Noted: 2022-08-01

## 2022-08-22 NOTE — ASSESSMENT
[FreeTextEntry1] : YVON WILLOUGHBY is a 36 year F with past medical history significant for FH. She is here for follow-up post ED visit for hypertension, leg edema and elevated pro-BNP, and possible pleural effusion at 3 days postpartum. She is overall doing well. BP is now within normal limits off meds and leg edema has resolved. repeat BNP is normal. She had an echo that showed that she has a structurally normal heart.  \par - I reviewed echo report and discussed the results with the patient \par - I reviewed labs . LDL is very high. We discussed risks and benefits of starting statins.  She is currently breastfeeding and wishes to hold off on starting statins at least for 6 months.  She will try healthier lifestyle changes/diet for now. \par - check lipids in 6 mo. \par \par RTO in 6 mo. \par \par \par Telemedicine -\par I confirmed that the patient was consented for telehealth and that the patient understands their rights to refuse telehealth. I introduced myself to the patient and identified myself as their consulting provider.  \par \par I verified that the patient is located in the state of [place] at the time of the telehealth encounter.  I verified the patient's identity by Name and Date of Birth. At the time of this telehealth encounter, I am located in the state of [place]. \par \par I have spent 15 minutes in this visit with the patient.\par \par Was the remote telehealth consult completed successfully? Yes\par

## 2022-08-22 NOTE — HISTORY OF PRESENT ILLNESS
[FreeTextEntry1] : YVON WILLOUGHBY is a 36 year y.o. F with history of FH (not on statins) and she is s/p delivery of a healthy baby on 6/22/22.  \par She is here for follow-up.  She had an echo that showed she has noraml LV function and no valve disease \par She is overall doing well and has no cardiac complaints at this time. \par BP is ok.  \par Denies CP, SOB, palpitations, orthopnea, dizziness, syncope, LH\par Patient denies changes in her exercise tolerance during the past 6 months. She can walk 10 blocks and can climb 2 flights of stairs. \par Sleeps with 1 pillow\par \par She denies history of MI, CVA, DM. \par Denies family history of premature ASCVD and /or SCD.  \par \par \par LABS (8/1/22): TG 71; ; HDL 80; ; NonHDL 199; ProBNP 15\par (6/28/22): Hgb 11.9; Hct 35.7; K 4.5; Cre 0.58; LFTs wnl; \par (6/26/22): ProBNP 506; Hgb 11.1; Hct 36.9; eGFR 120; \par \par EKG (8/1/22): NSR at 63 bpm with septal TWI \par \par Echo (8/8/22): Normal LV size and function. EF 60-65%. PASP 24 mmHg Normal diastolic function . No valve disease\par \par

## 2023-01-12 NOTE — ED ADULT TRIAGE NOTE - BRAND OF COVID-19 VACCINATION
Moderna dose 1 and 2 NOTIFICATION RETURN TO WORK / SCHOOL    1/12/2023 3:53 PM    [address]. [phone]      To Whom It May Concern:    Stephan Dakin is currently under the care of Jim Fallon. He will return to work/school on: 1/13/23. Seen in office TODAY. If there are questions or concerns please have the patient contact our office.         Sincerely,      Marleta Pallas, NP

## 2024-05-14 ENCOUNTER — APPOINTMENT (OUTPATIENT)
Dept: ANTEPARTUM | Facility: CLINIC | Age: 39
End: 2024-05-14
Payer: COMMERCIAL

## 2024-05-14 ENCOUNTER — ASOB RESULT (OUTPATIENT)
Age: 39
End: 2024-05-14

## 2024-05-14 PROCEDURE — 36415 COLL VENOUS BLD VENIPUNCTURE: CPT

## 2024-05-14 PROCEDURE — 93976 VASCULAR STUDY: CPT

## 2024-05-14 PROCEDURE — 76813 OB US NUCHAL MEAS 1 GEST: CPT

## 2024-06-11 ENCOUNTER — APPOINTMENT (OUTPATIENT)
Dept: ANTEPARTUM | Facility: CLINIC | Age: 39
End: 2024-06-11

## 2024-06-11 ENCOUNTER — ASOB RESULT (OUTPATIENT)
Age: 39
End: 2024-06-11

## 2024-06-11 PROCEDURE — 76817 TRANSVAGINAL US OBSTETRIC: CPT

## 2024-06-11 PROCEDURE — 76805 OB US >/= 14 WKS SNGL FETUS: CPT | Mod: 59

## 2024-06-18 ENCOUNTER — APPOINTMENT (OUTPATIENT)
Dept: ANTEPARTUM | Facility: CLINIC | Age: 39
End: 2024-06-18
Payer: COMMERCIAL

## 2024-06-18 ENCOUNTER — ASOB RESULT (OUTPATIENT)
Age: 39
End: 2024-06-18

## 2024-06-18 PROCEDURE — 76946 ECHO GUIDE FOR AMNIOCENTESIS: CPT

## 2024-06-18 PROCEDURE — 59000 AMNIOCENTESIS DIAGNOSTIC: CPT

## 2024-07-11 ENCOUNTER — APPOINTMENT (OUTPATIENT)
Dept: ANTEPARTUM | Facility: CLINIC | Age: 39
End: 2024-07-11

## 2024-07-11 ENCOUNTER — ASOB RESULT (OUTPATIENT)
Age: 39
End: 2024-07-11

## 2024-07-11 PROCEDURE — 76811 OB US DETAILED SNGL FETUS: CPT | Mod: 59

## 2024-07-11 PROCEDURE — 76817 TRANSVAGINAL US OBSTETRIC: CPT

## 2024-09-17 ENCOUNTER — ASOB RESULT (OUTPATIENT)
Age: 39
End: 2024-09-17

## 2024-09-17 ENCOUNTER — APPOINTMENT (OUTPATIENT)
Dept: ANTEPARTUM | Facility: CLINIC | Age: 39
End: 2024-09-17

## 2024-09-17 PROCEDURE — 76819 FETAL BIOPHYS PROFIL W/O NST: CPT | Mod: 59

## 2024-09-17 PROCEDURE — 76820 UMBILICAL ARTERY ECHO: CPT | Mod: 59

## 2024-09-17 PROCEDURE — 76816 OB US FOLLOW-UP PER FETUS: CPT

## 2024-10-01 ENCOUNTER — APPOINTMENT (OUTPATIENT)
Dept: ANTEPARTUM | Facility: CLINIC | Age: 39
End: 2024-10-01

## 2024-10-01 ENCOUNTER — ASOB RESULT (OUTPATIENT)
Age: 39
End: 2024-10-01

## 2024-10-01 PROCEDURE — 76816 OB US FOLLOW-UP PER FETUS: CPT

## 2024-10-01 PROCEDURE — 76820 UMBILICAL ARTERY ECHO: CPT | Mod: 59

## 2024-10-01 PROCEDURE — 76818 FETAL BIOPHYS PROFILE W/NST: CPT | Mod: 59

## 2024-10-01 PROCEDURE — 76821 MIDDLE CEREBRAL ARTERY ECHO: CPT | Mod: 59

## 2024-10-08 ENCOUNTER — ASOB RESULT (OUTPATIENT)
Age: 39
End: 2024-10-08

## 2024-10-08 ENCOUNTER — APPOINTMENT (OUTPATIENT)
Dept: ANTEPARTUM | Facility: CLINIC | Age: 39
End: 2024-10-08
Payer: COMMERCIAL

## 2024-10-08 PROCEDURE — 76821 MIDDLE CEREBRAL ARTERY ECHO: CPT | Mod: 59

## 2024-10-08 PROCEDURE — 76820 UMBILICAL ARTERY ECHO: CPT | Mod: 59

## 2024-10-08 PROCEDURE — 76816 OB US FOLLOW-UP PER FETUS: CPT

## 2024-10-08 PROCEDURE — 76818 FETAL BIOPHYS PROFILE W/NST: CPT | Mod: 59

## 2024-10-16 ENCOUNTER — ASOB RESULT (OUTPATIENT)
Age: 39
End: 2024-10-16

## 2024-10-16 ENCOUNTER — APPOINTMENT (OUTPATIENT)
Dept: ANTEPARTUM | Facility: CLINIC | Age: 39
End: 2024-10-16
Payer: COMMERCIAL

## 2024-10-16 PROCEDURE — 76820 UMBILICAL ARTERY ECHO: CPT | Mod: 59

## 2024-10-16 PROCEDURE — 76821 MIDDLE CEREBRAL ARTERY ECHO: CPT | Mod: 59

## 2024-10-16 PROCEDURE — 76816 OB US FOLLOW-UP PER FETUS: CPT

## 2024-10-16 PROCEDURE — 76818 FETAL BIOPHYS PROFILE W/NST: CPT | Mod: 59

## 2024-10-22 ENCOUNTER — APPOINTMENT (OUTPATIENT)
Dept: ANTEPARTUM | Facility: CLINIC | Age: 39
End: 2024-10-22

## 2024-10-22 ENCOUNTER — ASOB RESULT (OUTPATIENT)
Age: 39
End: 2024-10-22

## 2024-10-22 PROCEDURE — 76815 OB US LIMITED FETUS(S): CPT | Mod: 59

## 2024-10-22 PROCEDURE — 76820 UMBILICAL ARTERY ECHO: CPT

## 2024-10-22 PROCEDURE — 76818 FETAL BIOPHYS PROFILE W/NST: CPT

## 2024-10-22 PROCEDURE — 76821 MIDDLE CEREBRAL ARTERY ECHO: CPT

## 2024-10-29 ENCOUNTER — ASOB RESULT (OUTPATIENT)
Age: 39
End: 2024-10-29

## 2024-10-29 ENCOUNTER — APPOINTMENT (OUTPATIENT)
Dept: ANTEPARTUM | Facility: CLINIC | Age: 39
End: 2024-10-29
Payer: COMMERCIAL

## 2024-10-29 PROCEDURE — 76816 OB US FOLLOW-UP PER FETUS: CPT

## 2024-10-29 PROCEDURE — 76818 FETAL BIOPHYS PROFILE W/NST: CPT | Mod: 59

## 2024-10-29 PROCEDURE — 76820 UMBILICAL ARTERY ECHO: CPT | Mod: 59

## 2024-10-29 PROCEDURE — 76821 MIDDLE CEREBRAL ARTERY ECHO: CPT | Mod: 59

## 2024-11-04 ENCOUNTER — ASOB RESULT (OUTPATIENT)
Age: 39
End: 2024-11-04

## 2024-11-04 ENCOUNTER — APPOINTMENT (OUTPATIENT)
Dept: ANTEPARTUM | Facility: CLINIC | Age: 39
End: 2024-11-04
Payer: COMMERCIAL

## 2024-11-04 PROCEDURE — 76815 OB US LIMITED FETUS(S): CPT

## 2024-11-04 PROCEDURE — 76819 FETAL BIOPHYS PROFIL W/O NST: CPT

## 2024-11-04 PROCEDURE — 76820 UMBILICAL ARTERY ECHO: CPT

## 2024-11-12 ENCOUNTER — ASOB RESULT (OUTPATIENT)
Age: 39
End: 2024-11-12

## 2024-11-12 ENCOUNTER — APPOINTMENT (OUTPATIENT)
Dept: ANTEPARTUM | Facility: CLINIC | Age: 39
End: 2024-11-12
Payer: COMMERCIAL

## 2024-11-12 PROCEDURE — 76820 UMBILICAL ARTERY ECHO: CPT | Mod: 59

## 2024-11-12 PROCEDURE — 76816 OB US FOLLOW-UP PER FETUS: CPT

## 2024-11-12 PROCEDURE — 76818 FETAL BIOPHYS PROFILE W/NST: CPT

## 2024-11-18 ENCOUNTER — INPATIENT (INPATIENT)
Facility: HOSPITAL | Age: 39
LOS: 1 days | Discharge: HOME | End: 2024-11-20
Attending: STUDENT IN AN ORGANIZED HEALTH CARE EDUCATION/TRAINING PROGRAM | Admitting: STUDENT IN AN ORGANIZED HEALTH CARE EDUCATION/TRAINING PROGRAM
Payer: COMMERCIAL

## 2024-11-18 RX ORDER — TRISODIUM CITRATE DIHYDRATE AND CITRIC ACID MONOHYDRATE 500; 334 MG/5ML; MG/5ML
15 SOLUTION ORAL EVERY 6 HOURS
Refills: 0 | Status: DISCONTINUED | OUTPATIENT
Start: 2024-11-18 | End: 2024-11-19

## 2024-11-18 RX ORDER — 0.9 % SODIUM CHLORIDE 0.9 %
1000 INTRAVENOUS SOLUTION INTRAVENOUS
Refills: 0 | Status: DISCONTINUED | OUTPATIENT
Start: 2024-11-18 | End: 2024-11-19

## 2024-11-18 RX ORDER — CHLORHEXIDINE GLUCONATE 1.2 MG/ML
1 RINSE ORAL DAILY
Refills: 0 | Status: DISCONTINUED | OUTPATIENT
Start: 2024-11-18 | End: 2024-11-19

## 2024-11-19 VITALS
WEIGHT: 158.73 LBS | HEART RATE: 61 BPM | TEMPERATURE: 98 F | SYSTOLIC BLOOD PRESSURE: 133 MMHG | RESPIRATION RATE: 16 BRPM | OXYGEN SATURATION: 100 % | DIASTOLIC BLOOD PRESSURE: 70 MMHG | HEIGHT: 63 IN

## 2024-11-19 LAB
BASOPHILS # BLD AUTO: 0.04 K/UL — SIGNIFICANT CHANGE UP (ref 0–0.2)
BASOPHILS NFR BLD AUTO: 0.4 % — SIGNIFICANT CHANGE UP (ref 0–2)
BLD GP AB SCN SERPL QL: NEGATIVE — SIGNIFICANT CHANGE UP
EOSINOPHIL # BLD AUTO: 0.09 K/UL — SIGNIFICANT CHANGE UP (ref 0–0.5)
EOSINOPHIL NFR BLD AUTO: 0.9 % — SIGNIFICANT CHANGE UP (ref 0–6)
HCT VFR BLD CALC: 39.7 % — SIGNIFICANT CHANGE UP (ref 34.5–45)
HGB BLD-MCNC: 12.8 G/DL — SIGNIFICANT CHANGE UP (ref 11.5–15.5)
IMM GRANULOCYTES NFR BLD AUTO: 0.3 % — SIGNIFICANT CHANGE UP (ref 0–0.9)
LYMPHOCYTES # BLD AUTO: 1.81 K/UL — SIGNIFICANT CHANGE UP (ref 1–3.3)
LYMPHOCYTES # BLD AUTO: 17.7 % — SIGNIFICANT CHANGE UP (ref 13–44)
MCHC RBC-ENTMCNC: 29.6 PG — SIGNIFICANT CHANGE UP (ref 27–34)
MCHC RBC-ENTMCNC: 32.2 G/DL — SIGNIFICANT CHANGE UP (ref 32–36)
MCV RBC AUTO: 91.7 FL — SIGNIFICANT CHANGE UP (ref 80–100)
MONOCYTES # BLD AUTO: 0.82 K/UL — SIGNIFICANT CHANGE UP (ref 0–0.9)
MONOCYTES NFR BLD AUTO: 8 % — SIGNIFICANT CHANGE UP (ref 2–14)
NEUTROPHILS # BLD AUTO: 7.43 K/UL — HIGH (ref 1.8–7.4)
NEUTROPHILS NFR BLD AUTO: 72.7 % — SIGNIFICANT CHANGE UP (ref 43–77)
NRBC # BLD: 0 /100 WBCS — SIGNIFICANT CHANGE UP (ref 0–0)
PLATELET # BLD AUTO: 212 K/UL — SIGNIFICANT CHANGE UP (ref 150–400)
RBC # BLD: 4.33 M/UL — SIGNIFICANT CHANGE UP (ref 3.8–5.2)
RBC # FLD: 13.3 % — SIGNIFICANT CHANGE UP (ref 10.3–14.5)
RH IG SCN BLD-IMP: POSITIVE — SIGNIFICANT CHANGE UP
RH IG SCN BLD-IMP: POSITIVE — SIGNIFICANT CHANGE UP
T PALLIDUM AB TITR SER: NEGATIVE — SIGNIFICANT CHANGE UP
WBC # BLD: 10.22 K/UL — SIGNIFICANT CHANGE UP (ref 3.8–10.5)
WBC # FLD AUTO: 10.22 K/UL — SIGNIFICANT CHANGE UP (ref 3.8–10.5)

## 2024-11-19 RX ORDER — OXYCODONE HYDROCHLORIDE 30 MG/1
5 TABLET ORAL ONCE
Refills: 0 | Status: DISCONTINUED | OUTPATIENT
Start: 2024-11-19 | End: 2024-11-20

## 2024-11-19 RX ORDER — SIMETHICONE 125 MG
80 CAPSULE ORAL EVERY 4 HOURS
Refills: 0 | Status: DISCONTINUED | OUTPATIENT
Start: 2024-11-19 | End: 2024-11-20

## 2024-11-19 RX ORDER — OXYCODONE HYDROCHLORIDE 30 MG/1
5 TABLET ORAL
Refills: 0 | Status: DISCONTINUED | OUTPATIENT
Start: 2024-11-19 | End: 2024-11-20

## 2024-11-19 RX ORDER — ACETAMINOPHEN 500MG 500 MG/1
975 TABLET, COATED ORAL
Refills: 0 | Status: DISCONTINUED | OUTPATIENT
Start: 2024-11-19 | End: 2024-11-20

## 2024-11-19 RX ORDER — BENZOCAINE 10 %
1 OINTMENT (GRAM) TOPICAL EVERY 6 HOURS
Refills: 0 | Status: DISCONTINUED | OUTPATIENT
Start: 2024-11-19 | End: 2024-11-20

## 2024-11-19 RX ORDER — .BETA.-CAROTENE, SODIUM ACETATE, ASCORBIC ACID, CHOLECALCIFEROL, .ALPHA.-TOCOPHEROL ACETATE, DL-, THIAMINE MONONITRATE, RIBOFLAVIN, NIACINAMIDE, PYRIDOXINE HYDROCHLORIDE, FOLIC ACID, CYANOCOBALAMIN, CALCIUM CARBONATE, FERROUS FUMARATE, ZINC OXIDE AND CUPRIC OXIDE 2000; 2000; 120; 400; 22; 1.84; 3; 20; 10; 1; 12; 200; 27; 25; 2 [IU]/1; [IU]/1; MG/1; [IU]/1; MG/1; MG/1; MG/1; MG/1; MG/1; MG/1; UG/1; MG/1; MG/1; MG/1; MG/1
1 TABLET ORAL DAILY
Refills: 0 | Status: DISCONTINUED | OUTPATIENT
Start: 2024-11-19 | End: 2024-11-20

## 2024-11-19 RX ORDER — IBUPROFEN 200 MG
600 TABLET ORAL EVERY 6 HOURS
Refills: 0 | Status: COMPLETED | OUTPATIENT
Start: 2024-11-19 | End: 2025-10-18

## 2024-11-19 RX ORDER — DIBUCAINE 1 %
1 OINTMENT (GRAM) TOPICAL EVERY 6 HOURS
Refills: 0 | Status: DISCONTINUED | OUTPATIENT
Start: 2024-11-19 | End: 2024-11-20

## 2024-11-19 RX ORDER — LANOLIN 72 %
1 OINTMENT (GRAM) TOPICAL EVERY 6 HOURS
Refills: 0 | Status: DISCONTINUED | OUTPATIENT
Start: 2024-11-19 | End: 2024-11-20

## 2024-11-19 RX ORDER — WITCH HAZEL 50 G/100ML
1 SOLUTION RECTAL EVERY 4 HOURS
Refills: 0 | Status: DISCONTINUED | OUTPATIENT
Start: 2024-11-19 | End: 2024-11-20

## 2024-11-19 RX ORDER — FENTANYL/BUPIVACAINE/NS/PF 2-625MCG/1
250 PLASTIC BAG, INJECTION (ML) INJECTION
Refills: 0 | Status: DISCONTINUED | OUTPATIENT
Start: 2024-11-19 | End: 2024-11-20

## 2024-11-19 RX ORDER — TETANUS TOXOID, REDUCED DIPHTHERIA TOXOID AND ACELLULAR PERTUSSIS VACCINE, ADSORBED 5; 2.5; 8; 8; 2.5 [IU]/.5ML; [IU]/.5ML; UG/.5ML; UG/.5ML; UG/.5ML
0.5 SUSPENSION INTRAMUSCULAR ONCE
Refills: 0 | Status: DISCONTINUED | OUTPATIENT
Start: 2024-11-19 | End: 2024-11-20

## 2024-11-19 RX ORDER — KETOROLAC TROMETHAMINE 30 MG/ML
30 INJECTION INTRAMUSCULAR; INTRAVENOUS ONCE
Refills: 0 | Status: DISCONTINUED | OUTPATIENT
Start: 2024-11-19 | End: 2024-11-19

## 2024-11-19 RX ORDER — IBUPROFEN 200 MG
600 TABLET ORAL EVERY 6 HOURS
Refills: 0 | Status: DISCONTINUED | OUTPATIENT
Start: 2024-11-19 | End: 2024-11-20

## 2024-11-19 RX ORDER — SODIUM CHLORIDE 9 MG/ML
3 INJECTION, SOLUTION INTRAMUSCULAR; INTRAVENOUS; SUBCUTANEOUS EVERY 8 HOURS
Refills: 0 | Status: DISCONTINUED | OUTPATIENT
Start: 2024-11-19 | End: 2024-11-20

## 2024-11-19 RX ORDER — DIPHENHYDRAMINE HCL 25 MG
25 CAPSULE ORAL EVERY 6 HOURS
Refills: 0 | Status: DISCONTINUED | OUTPATIENT
Start: 2024-11-19 | End: 2024-11-20

## 2024-11-19 RX ORDER — PRAMOXINE HYDROCHLORIDE 1 MG/ML
1 LOTION TOPICAL EVERY 4 HOURS
Refills: 0 | Status: DISCONTINUED | OUTPATIENT
Start: 2024-11-19 | End: 2024-11-20

## 2024-11-19 RX ORDER — HYDROCORTISONE BUTYRATE 0.1 %
1 CREAM (GRAM) TOPICAL EVERY 6 HOURS
Refills: 0 | Status: DISCONTINUED | OUTPATIENT
Start: 2024-11-19 | End: 2024-11-20

## 2024-11-19 RX ADMIN — Medication 125 MILLILITER(S): at 00:19

## 2024-11-19 RX ADMIN — ACETAMINOPHEN 500MG 975 MILLIGRAM(S): 500 TABLET, COATED ORAL at 23:56

## 2024-11-19 RX ADMIN — ACETAMINOPHEN 500MG 975 MILLIGRAM(S): 500 TABLET, COATED ORAL at 18:01

## 2024-11-19 RX ADMIN — SODIUM CHLORIDE 3 MILLILITER(S): 9 INJECTION, SOLUTION INTRAMUSCULAR; INTRAVENOUS; SUBCUTANEOUS at 14:32

## 2024-11-19 RX ADMIN — Medication 2 MILLIUNIT(S)/MIN: at 00:38

## 2024-11-19 RX ADMIN — Medication 167 MILLIUNIT(S)/MIN: at 08:30

## 2024-11-19 NOTE — OB PROVIDER DELIVERY SUMMARY - NSSELHIDDEN_OBGYN_ALL_OB_FT
[NS_DeliveryAttending1_OBGYN_ALL_OB_FT:[id number]==],[NS_DeliveryRN_OBGYN_ALL_OB_FT:[id number]]

## 2024-11-19 NOTE — OB RN DELIVERY SUMMARY - NS_SEPSISRSKCALC_OBGYN_ALL_OB_FT
EOS calculated successfully. EOS Risk Factor: 0.5/1000 live births ([facility] national incidence); GA=39w2d; Temp=98.2; ROM=3.4; GBS='Negative'; Antibiotics='No antibiotics or any antibiotics < 2 hrs prior to birth'

## 2024-11-19 NOTE — OB RN PATIENT PROFILE - FALL HARM RISK - UNIVERSAL INTERVENTIONS
Bed in lowest position, wheels locked, appropriate side rails in place/Call bell, personal items and telephone in reach/Instruct patient to call for assistance before getting out of bed or chair/Non-slip footwear when patient is out of bed/[place] to call system/Physically safe environment - no spills, clutter or unnecessary equipment/Purposeful Proactive Rounding/Room/bathroom lighting operational, light cord in reach

## 2024-11-19 NOTE — OB PROVIDER DELIVERY SUMMARY - NSLOWPPHRISK_OBGYN_A_OB
No previous uterine incision/Liu Pregnancy/Less than or equal to 4 previous vaginal births/No history of postpartum hemorrhage/No other PPH risks indicated

## 2024-11-19 NOTE — OB PROVIDER LABOR PROGRESS NOTE - ASSESSMENT
-Epidural in situ  -Continue to monitor and titrate pitocin as tolerated    Christen Merlos PA-c
38yo  at 39w3d admitted for eIOL now ruptured on pitocin in active labor. No change since last exam, will continue pitocin and re-examine, consider IUPC if unchanged at next exam.

## 2024-11-19 NOTE — OB RN DELIVERY SUMMARY - NSSELHIDDEN_OBGYN_ALL_OB_FT
[NS_DeliveryAttending1_OBGYN_ALL_OB_FT:[id number]==],[NS_DeliveryRN_OBGYN_ALL_OB_FT:[id number]]

## 2024-11-19 NOTE — OB PROVIDER LABOR PROGRESS NOTE - NS_SUBJECTIVE/OBJECTIVE_OBGYN_ALL_OB_FT
Patient re-examined; VE 4/50/-2. AROM clear 0500.  EFM reviewed. Pit at 12 mu/min.  Baseline rate is 115 bpm, mod yinka, + accels, - decels  Ctx irregular, 4 in 10 mins  Cat I tracing  Continue to monitor labor progression  Dr. Og aware

## 2024-11-19 NOTE — OB PROVIDER LABOR PROGRESS NOTE - NS_OBIHIFHRDETAILS_OBGYN_ALL_OB_FT
Cat I: 115 bpm, moderate variability, +accels, -decels  Previous loss of contact 2/2 epidural
Baseline 115bpm +moderate variability +accels, intermittent early and variable decels

## 2024-11-19 NOTE — PRE-ANESTHESIA EVALUATION ADULT - ANESTHESIA, PREVIOUS REACTION, PROFILE
none Implemented All Fall Risk Interventions:  [place] to call system. Call bell, personal items and telephone within reach. Instruct patient to call for assistance. Room bathroom lighting operational. Non-slip footwear when patient is off stretcher. Physically safe environment: no spills, clutter or unnecessary equipment. Stretcher in lowest position, wheels locked, appropriate side rails in place. Provide visual cue, wrist band, yellow gown, etc. Monitor gait and stability. Monitor for mental status changes and reorient to person, place, and time. Review medications for side effects contributing to fall risk. Reinforce activity limits and safety measures with patient and family.

## 2024-11-19 NOTE — OB PROVIDER H&P - NSLOWPPHRISK_OBGYN_A_OB
No previous uterine incision/Liu Pregnancy/Less than or equal to 4 previous vaginal births/No known bleeding disorder/No history of postpartum hemorrhage/No other PPH risks indicated

## 2024-11-19 NOTE — OB PROVIDER LABOR PROGRESS NOTE - NS_SUBJECTIVE/OBJECTIVE_OBGYN_ALL_OB_FT
EFM reviewed.  Baseline rate is 125 bpm, mod yinka, +accels, - decels  Ctx irregular, 5 in 10 mins  Cat I tracing  Plan to re-examine at 0440 EFM reviewed. Pit at 10 mu/min.  Baseline rate is 125 bpm, mod yinka, +accels, - decels  Ctx irregular, 5 in 10 mins  Cat I tracing  Plan to re-examine at 0440

## 2024-11-19 NOTE — LACTATION INITIAL EVALUATION - NS LACT CON REASON FOR REQ
Offered to assist the mother with positioning and/or latching baby to breast, but mother declined hands-on help at this time. She exhibits basic knowledge regarding breastfeeding and verbalizes confidence in the breastfeeding process. I encouraged her to call me in at any time for questions or a latch check as needed. RN aware./multiparous mom

## 2024-11-19 NOTE — OB PROVIDER LABOR PROGRESS NOTE - NS_SUBJECTIVE/OBJECTIVE_OBGYN_ALL_OB_FT
FHT reviewed  120 bpm, moderate variability, +accels, -decels  Ctx 3-4 in 10 min  Cat 1  Continue to monitor closely for labor progression

## 2024-11-19 NOTE — PRE-ANESTHESIA EVALUATION ADULT - NSANTHPMHFT_GEN_ALL_CORE
38 y/o  at 39w2d presenting for term induction of labor.    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 Follows with Hematology; no interventions  needed.

## 2024-11-19 NOTE — OB RN PATIENT PROFILE - NS PRO PT RIGHT SUPPORT PERSON
Caller: Anoop Montenegro    Relationship: Self    Best call back number: [phone] (H)    Medication needed:   Requested Prescriptions     Pending Prescriptions Disp Refills   • irbesartan (AVAPRO) 300 MG tablet 90 tablet 1     Sig: Take 1 tablet by mouth Daily.       When do you need the refill by:AS SOON AS POSSIBLE    What additional details did the patient provide when requesting the medication: PATIENT HAS 10 DAYS LEFT, REFILL WAS SENT IN WRONG YESTERDAY. THIS IS THE CORRECT MEDICATION.     Does the patient have less than a 3 day supply:  [] Yes  [x] No    What is the patient's preferred pharmacy:      [facility] - [place], [place] - [address] AT Portal to Registered [facility] - [phone]  - [phone]   [phone]         Yes

## 2024-11-19 NOTE — OB PROVIDER DELIVERY SUMMARY - NSPROVIDERDELIVERYNOTE_OBGYN_ALL_OB_FT
Vigorous baby boy delivered over a tiny first degree laceration, repaired, patient tolerated well, Apgars 9/9, birth wt 3245g.

## 2024-11-19 NOTE — PRE-ANESTHESIA EVALUATION ADULT - NSANTHOSAYNRD_GEN_A_CORE
No. RANI screening performed.  STOP BANG Legend: 0-2 = LOW Risk; 3-4 = INTERMEDIATE Risk; 5-8 = HIGH Risk

## 2024-11-19 NOTE — OB PROVIDER H&P - HISTORY OF PRESENT ILLNESS
40 y/o  at 39w2d presenting for term induction of labor. She denies loss of fluid, contractions, and vaginal bleeding. She endorses normal fetal movement.    Ante: Spontaneous pregnancy. NIPT low risk and anatomy scan wnl. Elective amniocentesis wnl. 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 Follows with Hematology; no interventions  needed. FOB not a carrier. At 32w2d, [place] scan significant for S/D ratio elevated at 3.6, MCA doppler PI wnl, and CPR 5th%. Surveillance scans continued to show high/normal S/D ratio but with normal fetal growth at 49th%. Passed GCT. Denies elevated blood pressures in this pregnancy.  EFW 3200 g by [place] : Cephalic. Anterior placenta. JRARETT 12.86. BPP 10/10. EFW 3280 g, 49%. AC 69%  GBS negative    OBHX:  G1 -   2891 g, c/b gHTN and oligohydramnios  G2- Current  GynHX: denies fibroids, ovarian cysts, abnormal pap smear, STI/herpes.   MedHX: denies  Surghx: denies  Medications: PNV  Allergies: NKDA    Physical Exam:  T(C): 36.8 (24 @ 00:02), Max: 36.8 (24 @ 00:02)  HR: 61 (24 @ 00:02) (61 - 61)  BP: 133/70 (24 @ 00:02) (133/70 - 133/70)  RR: 16 (24 @ 00:02) (16 - 16)  SpO2: 100% (24 @ 00:02) (100% - 100%)    General: in no acute distress  Pulm: no increased WOB  Abdomen: soft, gravid, nontender  Extremities: wnl     TAUS: Cephalic  VE: 2-3/50/-3    EFM: 115 bpm, mod variability, + accels, - decels; reactive and reassuring  Ursine: no ctx seen on toco, uterine irritability noted    Assessment and Plan  40 y/o  at 39w2d presenting for term induction of labor  - Admit to L&D  - Consent signed for pitocin and vaginal delivery; all risks/benefits/alternatives were discussed and patient agreed to above plan  - NPO  - IV fluids and labs ordered  - GBS negative  - Continuous EFM     Discussed with PGY3 Dr. Bradley and attending Dr. Earle Doty, PGY1

## 2024-11-20 ENCOUNTER — TRANSCRIPTION ENCOUNTER (OUTPATIENT)
Age: 39
End: 2024-11-20

## 2024-11-20 VITALS
OXYGEN SATURATION: 100 % | TEMPERATURE: 98 F | HEART RATE: 55 BPM | RESPIRATION RATE: 17 BRPM | DIASTOLIC BLOOD PRESSURE: 72 MMHG | SYSTOLIC BLOOD PRESSURE: 110 MMHG

## 2024-11-20 PROCEDURE — 86901 BLOOD TYPING SEROLOGIC RH(D): CPT

## 2024-11-20 PROCEDURE — 86780 TREPONEMA PALLIDUM: CPT

## 2024-11-20 PROCEDURE — 85025 COMPLETE CBC W/AUTO DIFF WBC: CPT

## 2024-11-20 PROCEDURE — 59050 FETAL MONITOR W/REPORT: CPT

## 2024-11-20 PROCEDURE — 86900 BLOOD TYPING SEROLOGIC ABO: CPT

## 2024-11-20 PROCEDURE — 36415 COLL VENOUS BLD VENIPUNCTURE: CPT

## 2024-11-20 PROCEDURE — 86850 RBC ANTIBODY SCREEN: CPT

## 2024-11-20 RX ORDER — BENZOCAINE 10 %
1 OINTMENT (GRAM) TOPICAL
Qty: 0 | Refills: 0 | DISCHARGE
Start: 2024-11-20

## 2024-11-20 RX ORDER — ACETAMINOPHEN 500MG 500 MG/1
3 TABLET, COATED ORAL
Qty: 0 | Refills: 0 | DISCHARGE
Start: 2024-11-20

## 2024-11-20 RX ORDER — IBUPROFEN 200 MG
1 TABLET ORAL
Qty: 0 | Refills: 0 | DISCHARGE
Start: 2024-11-20

## 2024-11-20 RX ADMIN — ACETAMINOPHEN 500MG 975 MILLIGRAM(S): 500 TABLET, COATED ORAL at 15:29

## 2024-11-20 RX ADMIN — ACETAMINOPHEN 500MG 975 MILLIGRAM(S): 500 TABLET, COATED ORAL at 05:47

## 2024-11-20 RX ADMIN — ACETAMINOPHEN 500MG 975 MILLIGRAM(S): 500 TABLET, COATED ORAL at 14:29

## 2024-11-20 NOTE — DISCHARGE NOTE OB - FINANCIAL ASSISTANCE
[facility] provides services at a reduced cost to those who are determined to be eligible through [facility]’s financial assistance program. Information regarding [facility]’s financial assistance program can be found by going to [email or web address] or by calling [phone].

## 2024-11-20 NOTE — PROGRESS NOTE ADULT - ASSESSMENT
A/P 39y s/p , PPD#1, stable, meeting postpartum milestones   - Pain: well controlled on tylenol/motrin  - GI: Tolerating regular diet  - : urinating without difficulty/pain  - DVT prophylaxis: ambulating frequently  - Dispo: PPD 2, unless otherwise specified

## 2024-11-20 NOTE — PROGRESS NOTE ADULT - SUBJECTIVE AND OBJECTIVE BOX
Patient evaluated at bedside this morning, resting comfortable in bed, no acute events overnight.  She reports pain is well controlled with tylenol and motrin.  She denies headache, dizziness, chest pain, palpitations, shortness of breath, nausea, vomiting, fever, chills, heavy vaginal bleeding. She has been ambulating without assistance, voiding spontaneously.  Tolerating food well, without nausea/vomit. Passing flatus. -BM.    Physical Exam:  T(C): 36.4 (11-20-24 @ 06:05), Max: 36.7 (11-19-24 @ 22:02)  HR: 55 (11-20-24 @ 06:05) (55 - 55)  BP: 110/72 (11-20-24 @ 06:05) (110/72 - 115/75)  RR: 17 (11-20-24 @ 06:05) (16 - 17)  SpO2: 100% (11-20-24 @ 06:05) (99% - 100%)    GA: NAD, A&O x 3  Pulm: no increased work of breathing  Abd: soft, nontender, nondistended, no rebound or guarding, uterus firm.  Extremities: no calf tenderness                          12.8   10.22 )-----------( 212      ( 19 Nov 2024 00:27 )             39.7           acetaminophen     Tablet .. 975 milliGRAM(s) Oral <User Schedule>  benzocaine 20%/menthol 0.5% Spray 1 Spray(s) Topical every 6 hours PRN  dibucaine 1% Ointment 1 Application(s) Topical every 6 hours PRN  diphenhydrAMINE 25 milliGRAM(s) Oral every 6 hours PRN  diphtheria/tetanus/pertussis (acellular) Vaccine (Adacel) 0.5 milliLiter(s) IntraMuscular once  fentanyl (2 MICROgram(s)/mL) + bupivacaine 0.0625%  in 0.9% Sodium Chloride PCEA 250 milliLiter(s) Epidural PCA Continuous  hydrocortisone 1% Cream 1 Application(s) Topical every 6 hours PRN  ibuprofen  Tablet. 600 milliGRAM(s) Oral every 6 hours  lanolin Ointment 1 Application(s) Topical every 6 hours PRN  magnesium hydroxide Suspension 30 milliLiter(s) Oral two times a day PRN  oxyCODONE    IR 5 milliGRAM(s) Oral every 3 hours PRN  oxyCODONE    IR 5 milliGRAM(s) Oral once PRN  oxytocin Infusion 167 milliUNIT(s)/Min IV Continuous <Continuous>  pramoxine 1%/zinc 5% Cream 1 Application(s) Topical every 4 hours PRN  prenatal multivitamin 1 Tablet(s) Oral daily  simethicone 80 milliGRAM(s) Chew every 4 hours PRN  sodium chloride 0.9% lock flush 3 milliLiter(s) IV Push every 8 hours  witch hazel Pads 1 Application(s) Topical every 4 hours PRN

## 2024-11-20 NOTE — DISCHARGE NOTE OB - CARE PROVIDER_API CALL
Nahomi Og  Obstetrics and Gynecology  [address] [place] [address], [address]  [place] [address]  Phone: [phone]  Fax: [phone]  Follow Up Time: 2 months

## 2024-11-20 NOTE — DISCHARGE NOTE OB - MEDICATION SUMMARY - MEDICATIONS TO STOP TAKING
I will STOP taking the medications listed below when I get home from the hospital:    labetalol 200 mg oral tablet  -- 1 tab(s) by mouth 2 times a day   -- It is very important that you take or use this exactly as directed.  Do not skip doses or discontinue unless directed by your doctor.  May cause drowsiness or dizziness.  Some non-prescription drugs may aggravate your condition.  Read all labels carefully.  If a warning appears, check with your doctor before taking.

## 2024-11-20 NOTE — DISCHARGE NOTE OB - PATIENT PORTAL LINK FT
You can access the FollowMyHealth Patient Portal offered by [facility] by registering at the following website: [email or web address]. By joining [facility]’s FollowMyHealth portal, you will also be able to view your health information using other applications (apps) compatible with our system.

## 2024-11-26 DIAGNOSIS — Z14.8 GENETIC CARRIER OF OTHER DISEASE: ICD-10-CM

## 2024-11-26 DIAGNOSIS — Z34.83 ENCOUNTER FOR SUPERVISION OF OTHER NORMAL PREGNANCY, THIRD TRIMESTER: ICD-10-CM

## 2024-11-26 DIAGNOSIS — Z79.899 OTHER LONG TERM (CURRENT) DRUG THERAPY: ICD-10-CM

## 2024-11-26 DIAGNOSIS — Z3A.39 39 WEEKS GESTATION OF PREGNANCY: ICD-10-CM

## 2024-11-26 DIAGNOSIS — Z28.09 IMMUNIZATION NOT CARRIED OUT BECAUSE OF OTHER CONTRAINDICATION: ICD-10-CM
